# Patient Record
Sex: FEMALE | Race: WHITE | Employment: FULL TIME | URBAN - METROPOLITAN AREA
[De-identification: names, ages, dates, MRNs, and addresses within clinical notes are randomized per-mention and may not be internally consistent; named-entity substitution may affect disease eponyms.]

---

## 2017-05-09 ENCOUNTER — HOSPITAL ENCOUNTER (EMERGENCY)
Facility: CLINIC | Age: 42
Discharge: HOME | End: 2017-05-09
Attending: EMERGENCY MEDICINE | Admitting: EMERGENCY MEDICINE
Payer: OTHER MISCELLANEOUS

## 2017-05-09 VITALS
WEIGHT: 165 LBS | BODY MASS INDEX: 30.36 KG/M2 | HEIGHT: 62 IN | HEART RATE: 95 BPM | TEMPERATURE: 98.6 F | SYSTOLIC BLOOD PRESSURE: 153 MMHG | OXYGEN SATURATION: 100 % | DIASTOLIC BLOOD PRESSURE: 88 MMHG | RESPIRATION RATE: 14 BRPM

## 2017-05-09 DIAGNOSIS — Z57.8 EMPLOYEE EXPOSURE TO BODY FLUIDS: ICD-10-CM

## 2017-05-09 DIAGNOSIS — S61.412A HAND LACERATION, LEFT, INITIAL ENCOUNTER: ICD-10-CM
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
## 2017-05-09 PROCEDURE — 99283 EMERGENCY DEPT VISIT LOW MDM: CPT | Mod: Z6 | Performed by: EMERGENCY MEDICINE

## 2017-05-09 PROCEDURE — 25000125 ZZHC RX 250: Performed by: EMERGENCY MEDICINE

## 2017-05-09 PROCEDURE — 90471 IMMUNIZATION ADMIN: CPT | Performed by: EMERGENCY MEDICINE

## 2017-05-09 PROCEDURE — 90715 TDAP VACCINE 7 YRS/> IM: CPT | Performed by: EMERGENCY MEDICINE

## 2017-05-09 PROCEDURE — 99283 EMERGENCY DEPT VISIT LOW MDM: CPT | Mod: 25 | Performed by: EMERGENCY MEDICINE

## 2017-05-09 RX ADMIN — CLOSTRIDIUM TETANI TOXOID ANTIGEN (FORMALDEHYDE INACTIVATED), CORYNEBACTERIUM DIPHTHERIAE TOXOID ANTIGEN (FORMALDEHYDE INACTIVATED), BORDETELLA PERTUSSIS TOXOID ANTIGEN (GLUTARALDEHYDE INACTIVATED), BORDETELLA PERTUSSIS FILAMENTOUS HEMAGGLUTININ ANTIGEN (FORMALDEHYDE INACTIVATED), BORDETELLA PERTUSSIS PERTACTIN ANTIGEN, AND BORDETELLA PERTUSSIS FIMBRIAE 2/3 ANTIGEN 0.5 ML: 5; 2; 2.5; 5; 3; 5 INJECTION, SUSPENSION INTRAMUSCULAR at 18:18

## 2017-05-09 SDOH — HEALTH STABILITY - PHYSICAL HEALTH: OCCUPATIONAL EXPOSURE TO OTHER RISK FACTORS: Z57.8

## 2017-05-09 ASSESSMENT — ENCOUNTER SYMPTOMS
FEVER: 0
WOUND: 1
CHILLS: 0
HEMATOLOGIC/LYMPHATIC NEGATIVE: 1

## 2017-05-09 NOTE — ED AVS SNAPSHOT
[facility], [place], Emergency Department    [address] [facility] [address]    Phone:  [phone]                                       Dianelys Wells   MRN: [id number]    Department:  [facility], Emergency Department   Date of Visit:  5/9/2017           After Visit Summary Signature Page     I have received my discharge instructions, and my questions have been answered. I have discussed any challenges I see with this plan with the nurse or doctor.    ..........................................................................................................................................  Patient/Patient Representative Signature      ..........................................................................................................................................  Patient Representative Print Name and Relationship to Patient    ..................................................               ................................................  Date                                            Time    ..........................................................................................................................................  Reviewed by Signature/Title    ...................................................              ..............................................  Date                                                            Time

## 2017-05-09 NOTE — ED AVS SNAPSHOT
[facility], Emergency Department    [address] [address]    Phone:  [phone]                                       Dianelys Wells   MRN: [id number]    Department:  [facility], Emergency Department   Date of Visit:  5/9/2017           Patient Information     Date Of Birth          1975        Your diagnoses for this visit were:     Hand laceration, left, initial encounter        You were seen by Fe Orta MD.        Discharge Instructions       Thank you for coming to the [facility] Emergency Department.     Tetanus immunization status updated today with Tdap.     Keep the hand wound clean and dry today, then wash hands as usual starting tomorrow. Monitor the wound for signs of infection and return for redness, swelling, increased pain, pus draining from the wound, or fever >100.4F.     Follow up with the nursing supervisor tonight for source patient blood test results. Return to the ER tonight to start HIV PEP if source pt blood is positive.           *LACERATION (All: sutures, staples, tape, glue)  A laceration is a cut through the skin. This will usually require stitches (sutures) or staples if it is deep. Minor cuts may be treated with a tape closure ( Steri-Strips ) or Dermabond skin glue.    HOME CARE:  1. EXTREMITY, FACE or TRUNK WOUNDS: Keep the wound clean and dry. If a bandage was applied and it becomes wet or dirty, replace it. Otherwise, leave it in place for the first 24 hours.    If stitches or staples were used, clean the wound daily. Protect the wound from sunlight and tanning lamps.    After removing the bandage, wash the area with soap and water. Use a wet cotton swab (Q tip) to loosen and remove any blood or crust that forms.    After cleaning, apply a thin layer of Polysporin or Bacitracin ointment. This will keep the wound clean and make it easier to remove the stitches or staples. Reapply a fresh bandage.    You may  remove the bandage to shower as usual after the first 24 hours, but do not soak the area in water (no swimming) until the stitches or staples are removed.    If Steri-Strips were used, keep the area clean and dry. If it becomes wet, blot it dry with a towel. It is okay to take a brief shower, but avoid scrubbing the area.    If Dermabond skin adhesive was used, do not scratch, rub or pick at the adhesive film. Do not place tape directly over the film. Do not apply liquid, ointment or creams to the wound while the film is in place. Do not clean the wound with peroxide and do not apply ointments. Avoid activities that cause heavy sweating until the film has fallen off. Protect the wound from prolonged exposure to sunlight or tanning lamps. You may shower as usual but do not soak the wound in water (no baths or swimming). The film will fall off by itself in 5-10 days.  2. SCALP WOUNDS: During the first two days, you may carefully rinse your hair in the shower to remove blood, glass or dirt particles. After two days, you may shower and shampoo your hair normally. Do not soak your scalp in the tub or go swimming until the stitches or staples have been removed.  3. MOUTH WOUNDS: Eat soft foods to reduce pain. If the cut is inside of your mouth, clean by rinsing after each meal and at bedtime with a mixture of equal parts water and Hydrogen Peroxide (do not swallow!). Or, you can use a cotton swab to directly apply Hydrogen Peroxide onto the cut.  4. You may use acetaminophen (Tylenol) 650-1000 mg every 6 hours or ibuprofen (Motrin, Advil) 600 mg every 6-8 hours with food to control pain, if you are able to take these medicines. [NOTE: If you have chronic liver or kidney disease or ever had a stomach ulcer or GI bleeding, talk with your doctor before using these medicines.]  Use sunscreen on the area for 6 months after the wound heals to keep the scar from getting darker.   FOLLOW UP: Most skin wounds heal within ten days.  Mouth and facial wounds heal within five days. However, even with proper treatment, a wound infection may sometimes occur. Therefore, you should check the wound daily for signs of infection listed below.  Stitches should be removed from the face within five days; stitches and staples should be removed from other parts of the body within 7-10 days. Unless you are told to come back to the emergency room, you may have your doctor or urgent care remove the stitches. If dissolving stitches were used in the mouth, these will fall out or dissolve without the need for removal. If tape closures ( Steri-Strips ) were used, remove them yourself if they have not fallen off after 7 days. If Dermabond skin glue was used, the film will fall off by itself in 5-10 days.   GET PROMPT MEDICAL ATTENTION if any of the following occur:    Increasing pain in the wound    Redness, swelling or pus coming from the wound    Fever over 101 F (38.3 C) oral    If stitches or staples come apart or fall out or if Steri-Strips fall off before seven days    If the wound edges re-open    Bleeding not controlled by direct pressure    8873-9218 [employer], [address] [place] [address]. All rights reserved. This information is not intended as a substitute for professional medical care. Always follow your healthcare professional's instructions.      24 Hour Appointment Hotline       To make an appointment at any [facility], call [phone] ([phone]). If you don't have a family doctor or clinic, we will help you find one. [facility] [place] clinics are conveniently located to serve the needs of you and your family.             Review of your medicines      Our records show that you are taking the medicines listed below. If these are incorrect, please call your family doctor or clinic.        Dose / Directions Last dose taken    WELLBUTRIN PO   Dose:  300 mg        Take 300 mg by mouth   Refills:  0                Procedures  and tests performed during your visit     Rapid HIV 1 and 2 Antigen Antibody      Orders Needing Specimen Collection     None      Pending Results     No orders found from 5/7/2017 to 5/10/2017.            Pending Culture Results     No orders found from 5/7/2017 to 5/10/2017.            Pending Results Instructions     If you had any lab results that were not finalized at the time of your Discharge, you can call the ED Lab Result RN at [phone]. You will be contacted by this team for any positive Lab results or changes in treatment. The nurses are available 7 days a week from 10A to 6:30P.  You can leave a message 24 hours per day and they will return your call.        Thank you for choosing [facility]       Thank you for choosing [facility] for your care. Our goal is always to provide you with excellent care. Hearing back from our patients is one way we can continue to improve our services. Please take a few minutes to complete the written survey that you may receive in the mail after you visit with us. Thank you!        [employer] Information     [employer] gives you secure access to your electronic health record. If you see a primary care provider, you can also send messages to your care team and make appointments. If you have questions, please call your primary care clinic.  If you do not have a primary care provider, please call [phone] and they will assist you.        Care EveryWhere ID     This is your Care EveryWhere ID. This could be used by other organizations to access your [facility] medical records  [id number]        After Visit Summary       This is your record. Keep this with you and show to your community pharmacist(s) and doctor(s) at your next visit.

## 2017-05-09 NOTE — DISCHARGE INSTRUCTIONS
Thank you for coming to the [facility] Emergency Department.     Tetanus immunization status updated today with Tdap.     Keep the hand wound clean and dry today, then wash hands as usual starting tomorrow. Monitor the wound for signs of infection and return for redness, swelling, increased pain, pus draining from the wound, or fever >100.4F.     Follow up with the nursing supervisor tonight for source patient blood test results. Return to the ER tonight to start HIV PEP if source pt blood is positive.           *LACERATION (All: sutures, staples, tape, glue)  A laceration is a cut through the skin. This will usually require stitches (sutures) or staples if it is deep. Minor cuts may be treated with a tape closure ( Steri-Strips ) or Dermabond skin glue.    HOME CARE:  1. EXTREMITY, FACE or TRUNK WOUNDS: Keep the wound clean and dry. If a bandage was applied and it becomes wet or dirty, replace it. Otherwise, leave it in place for the first 24 hours.    If stitches or staples were used, clean the wound daily. Protect the wound from sunlight and tanning lamps.    After removing the bandage, wash the area with soap and water. Use a wet cotton swab (Q tip) to loosen and remove any blood or crust that forms.    After cleaning, apply a thin layer of Polysporin or Bacitracin ointment. This will keep the wound clean and make it easier to remove the stitches or staples. Reapply a fresh bandage.    You may remove the bandage to shower as usual after the first 24 hours, but do not soak the area in water (no swimming) until the stitches or staples are removed.    If Steri-Strips were used, keep the area clean and dry. If it becomes wet, blot it dry with a towel. It is okay to take a brief shower, but avoid scrubbing the area.    If Dermabond skin adhesive was used, do not scratch, rub or pick at the adhesive film. Do not place tape directly over the film. Do not apply liquid, ointment or creams to  the wound while the film is in place. Do not clean the wound with peroxide and do not apply ointments. Avoid activities that cause heavy sweating until the film has fallen off. Protect the wound from prolonged exposure to sunlight or tanning lamps. You may shower as usual but do not soak the wound in water (no baths or swimming). The film will fall off by itself in 5-10 days.  2. SCALP WOUNDS: During the first two days, you may carefully rinse your hair in the shower to remove blood, glass or dirt particles. After two days, you may shower and shampoo your hair normally. Do not soak your scalp in the tub or go swimming until the stitches or staples have been removed.  3. MOUTH WOUNDS: Eat soft foods to reduce pain. If the cut is inside of your mouth, clean by rinsing after each meal and at bedtime with a mixture of equal parts water and Hydrogen Peroxide (do not swallow!). Or, you can use a cotton swab to directly apply Hydrogen Peroxide onto the cut.  4. You may use acetaminophen (Tylenol) 650-1000 mg every 6 hours or ibuprofen (Motrin, Advil) 600 mg every 6-8 hours with food to control pain, if you are able to take these medicines. [NOTE: If you have chronic liver or kidney disease or ever had a stomach ulcer or GI bleeding, talk with your doctor before using these medicines.]  Use sunscreen on the area for 6 months after the wound heals to keep the scar from getting darker.   FOLLOW UP: Most skin wounds heal within ten days. Mouth and facial wounds heal within five days. However, even with proper treatment, a wound infection may sometimes occur. Therefore, you should check the wound daily for signs of infection listed below.  Stitches should be removed from the face within five days; stitches and staples should be removed from other parts of the body within 7-10 days. Unless you are told to come back to the emergency room, you may have your doctor or urgent care remove the stitches. If dissolving stitches were  used in the mouth, these will fall out or dissolve without the need for removal. If tape closures ( Steri-Strips ) were used, remove them yourself if they have not fallen off after 7 days. If Dermabond skin glue was used, the film will fall off by itself in 5-10 days.   GET PROMPT MEDICAL ATTENTION if any of the following occur:    Increasing pain in the wound    Redness, swelling or pus coming from the wound    Fever over 101 F (38.3 C) oral    If stitches or staples come apart or fall out or if Steri-Strips fall off before seven days    If the wound edges re-open    Bleeding not controlled by direct pressure    2035-5585 [employer], [address] [place] [address], [place] [address]. All rights reserved. This information is not intended as a substitute for professional medical care. Always follow your healthcare professional's instructions.

## 2017-05-09 NOTE — ED PROVIDER NOTES
"  History     Chief Complaint   Patient presents with     Laceration     Body Fluid Exposure     HPI  Dianelys Wells is a 41 year old female who presents with a hand laceration and body fluid exposure.   Cut the hand with a clean scalpel through a dirty glove. Pt works as a pathology resident.   Hep B immunized.   Hx of negative HIV testing.     Source pt risk factors for infection are not currently known. Source pt is available in the hospital for testing.     I have reviewed the Medications, Allergies, Past Medical and Surgical History, and Social History in the Epic system.    PMH: healthy    Review of Systems   Constitutional: Negative for chills and fever.   Skin: Positive for wound.   Neurological:        No sensation loss   Hematological: Negative.           Physical Exam   BP: 153/88  Pulse: 95  Temp: 98.6  F (37  C)  Resp: 18  Height: 157.5 cm (5' 2\")  Weight: 74.8 kg (165 lb)  SpO2: 100 %    Physical Exam  Gen:A&Ox3, no acute distress  HEENT:head atraumatic  UE: left palm with 0.5cm laceration just proximal to the 4th MCP. Minimal bleeding. No deep structures visible in the base of the wound. No foreign bodies noted. Flexor tendon function of the hand intact. Sensation of the hand and fingers intact. 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   Last Tetanus immunization in 2007, updated today with Tdap.   She accidentally cut her hand with a scalpel during a gross pathology evaluation. Patient presented here to the ED and the Employee Health blood exposure packet was started including patient blood draw and the nursing supervisor is working to obtain source patient blood for testing. Patient is immunized against hepatitis B. She has previously " been negative for HIV testing. She is uncertain of any high-risk features of the source patient. Laceration to the left hand is on the palmar surface just below the 4th MCP with minimal bleeding and no signs of neurovascular or tendon injuries. The wound was iririgaated with saline and closed with steri-strips. I reviewed with her wound care instructions including decreasing stress on the wound for the next week, handwashing technique, and return instructions in the sign or event of infection. She will follow-up with the nursing supervisor regarding source blood and her blood testing for today and will return to the ED for post-exposure prophylaxis if source patient is found to be HIV positive. Follow-up with employee health.      This part of the medical record was transcribed by Gino Eastman, Medical Scribe, from a dictation done by Fe Orta MD.    I have reviewed the nursing notes.  I have reviewed the findings, diagnosis, plan and need for follow up with the patient.    Discharge Medication List as of 5/9/2017  6:07 PM          Final diagnoses:   Hand laceration, left, initial encounter   Employee exposure to body fluids       5/9/2017   [facility], [place], EMERGENCY DEPARTMENT    MD DONAVAN Jacob Katrina Anne, MD  05/11/17 0947

## 2017-05-09 NOTE — ED NOTES
"Triage Assessment & Note:    /88  Pulse 95  Temp 98.6  F (37  C) (Oral)  Resp 18  Ht 1.575 m (5' 2\")  Wt 74.8 kg (165 lb)  SpO2 100%  BMI 30.18 kg/m2    Patient presents with: Resident in pathology lab was struck with a clean surgical blade but had a dirty glove on.     Home Treatments/Remedies: soap and water    Febrile / Afebrile? Afebrile    Duration of C/o:  1610    Dell Infante  May 9, 2017      "

## 2017-08-12 ENCOUNTER — HEALTH MAINTENANCE LETTER (OUTPATIENT)
Age: 42
End: 2017-08-12

## 2018-03-05 ENCOUNTER — OFFICE VISIT (OUTPATIENT)
Dept: ORTHOPEDICS | Facility: CLINIC | Age: 43
End: 2018-03-05
Payer: COMMERCIAL

## 2018-03-05 ENCOUNTER — RADIANT APPOINTMENT (OUTPATIENT)
Dept: GENERAL RADIOLOGY | Facility: CLINIC | Age: 43
End: 2018-03-05
Attending: FAMILY MEDICINE
Payer: COMMERCIAL

## 2018-03-05 VITALS
HEART RATE: 80 BPM | SYSTOLIC BLOOD PRESSURE: 129 MMHG | DIASTOLIC BLOOD PRESSURE: 85 MMHG | WEIGHT: 188.5 LBS | BODY MASS INDEX: 35.59 KG/M2 | HEIGHT: 61 IN

## 2018-03-05 DIAGNOSIS — M25.562 ACUTE PAIN OF LEFT KNEE: ICD-10-CM

## 2018-03-05 DIAGNOSIS — M25.562 ACUTE PAIN OF LEFT KNEE: Primary | ICD-10-CM

## 2018-03-05 RX ORDER — MEDROXYPROGESTERONE ACETATE 150 MG/ML
150 INJECTION, SUSPENSION INTRAMUSCULAR
COMMUNITY

## 2018-03-05 NOTE — PROGRESS NOTES
"[facility] Sports and Orthopedic Walk-in Clinic Note      Patient is a 42 year old female who presents to the office today for: left knee pain   six weeks ago she tripped and fell going up the stairs. Had pain in left knee laterally. Pain and swelling have improved but still Pain with exercising and full flexion. Used crutches initially. Now ok with walking. Pain with pivoting and shifting. . Has been icing and taking aleve. No prior knee injury.     Denies weakness, numbness, tingling, clicking, locking, or catching.      Past Medical History, Current Medications, and Allergies are reviewed in the electronic medical record as appropriate.     ROS: Pertinent items are noted in HPI.  Constitutional: negative for fevers, chills and malaise  Cardiovascular: negative for dyspnea, fatigue, lower extremity edema  Integument/breast: negative for rash, skin lesion(s) and skin color change  Neurological: negative for paresthesia and weakness      EXAM:/85  Pulse 80  Ht 5' 1.42\" (1.56 m)  Wt 188 lb 8 oz (85.5 kg)  BMI 35.13 kg/m2    Patient is alert, No acute distress, pleasant and conversational.    Gait: nonantalgic. Normal heel toe gait.    left knee:   Skin intact. No erythema or ecchymosis.  No effusion or soft tissue swelling.    AROM: Zero to approximately 135  with pain at full flexion    Palpation: No medial or lateral facet joint tenderness.  No posterior medial or posterior lateral joint line tenderness     Special Tests:  Positive bounce test, + forced flexion and + Arsalan's.  No ligamentous laxity or pain with valgus or varus stress.  Negative Lachman's, Anterior Drawer and Posterior Drawer     Full Isometric quad strength, extensor mechanism in place     Neurovascularly intact in the lower extremity    Hip and Ankle with full AROM and nontender      Radiographs: xrays of left knee performed and reviewed independently demonstrating essentially normal left knee    Assessment: Patient is a 42 year old " female with persistent left knee pain after fall 6 weeks ago. Concern for meniscus injury.     Recommendations:   Discussed MRI vs trial of PT and bracing and patient opts for the latter  Fitted with procare stabilizing knee brace   Given home exercises and PT referral  Follow up in 6 weeks or sooner if worsening symptoms.     Surjit Carbajal MD

## 2018-03-05 NOTE — PROGRESS NOTES
NEW PATIENT INTAKE QUESTIONNAIRE  SPORTS & ORTHOPEDIC WALK-IN 3/5/2018    Primary Care Physician: Dr. Lazara Starks at [facility]    Where are the majority of your medical records? [facility]    Reason for Visit:    What part of your body is injured / painful?  left knee    What caused the injury /pain? Fall    How long ago did your injury occur or pain begin? several weeks ago    What are your most bothersome symptoms? Pain, Weakness, Inability to compete/participate in sport or activity and Other: Unable to work out    How would you characterize your symptom? sharp    What makes your symptoms better? Rest, Ice and Ibuprofen    What makes your symptoms worse? Bending and Squatting    Have you been previously seen for this problem? No    Medical History:    Medical History: none    Have you had surgery on this body part before? No    Medications: Depo and Wellbutrin    Allergies: Yes: Sulfa     Family History of Medical Problems: no    Previous Surgeries: none    Social History:    Occupation: Medical Resident     Handedness: Right    Exercise: 4-5 days/week    Review of Systems:    Have you recently had a a fever, chills, weight loss? No    Do you have any vision problems? No    Do you have any chest pain or edema? No    Do you have any shortness of breath or wheezing?  No    Do you have stomach problems? No    Do you have any numbness or focal weakness? No    Do you have diabetes? No    Do you have problems with bleeding or clotting? No    Do you have an rashes or other skin lesions? No    Communication:    How did you hear about us? I [place] an advertisement[place] [facility] [place]    Who else should know about this visit? PCP, Dr. Starks

## 2018-03-05 NOTE — Clinical Note
3/5/2018       RE: Dianelys Wells  [address] [facility] [address]     Dear Colleague,    Thank you for referring your patient, Dianelys Wells, to the [facility] SPORTS AND ORTHOPAEDIC WALK IN CLINIC at [facility]. Please see a copy of my visit note below.    No notes on file    Again, thank you for allowing me to participate in the care of your patient.      Sincerely,    Surjit Carbajal MD

## 2018-03-05 NOTE — MR AVS SNAPSHOT
"              After Visit Summary   3/5/2018    Dianelys Wells    MRN: [id number]           Patient Information     Date Of Birth          1975        Visit Information        Provider Department      3/5/2018 3:20 PM Surjit Carbajal MD [facility]        Today's Diagnoses     Acute pain of left knee    -  1       Follow-ups after your visit        Additional Services     [facility] PT, HAND, AND CHIROPRACTIC REFERRAL       Physical Therapy Referral                  Who to contact     Please call your clinic at [phone] to:    Ask questions about your health    Make or cancel appointments    Discuss your medicines    Learn about your test results    Speak to your doctor            Additional Information About Your Visit        Cubiehart Information     [employer] gives you secure access to your electronic health record. If you see a primary care provider, you can also send messages to your care team and make appointments. If you have questions, please call your primary care clinic.  If you do not have a primary care provider, please call [phone] and they will assist you.      [employer] is an electronic gateway that provides easy, online access to your medical records. With [employer], you can request a clinic appointment, read your test results, renew a prescription or communicate with your care team.     To access your existing account, please contact your [facility] or call [phone] for assistance.        Care EveryWhere ID     This is your Care EveryWhere ID. This could be used by other organizations to access your [facility] medical records  [id number]        Your Vitals Were     Pulse Height BMI (Body Mass Index)             80 5' 1.42\" (1.56 m) 35.13 kg/m2          Blood Pressure from Last 3 Encounters:   03/05/18 129/85   05/09/17 153/88   02/28/15 139/61    Weight from Last 3 Encounters:   03/05/18 188 lb 8 oz (85.5 kg)   05/09/17 " 165 lb (74.8 kg)              We Performed the Following     [facility] PT, HAND, AND CHIROPRACTIC REFERRAL        Primary Care Provider Office Phone # Fax #    Lazara Starks [phone]       [facility] [address]  [facility] [address]        Equal Access to Services     BAO NOE : Hadii aad ku hadasho Soomaali, waaxda luqadaha, qaybta kaalmada adeegyada, waxay idiin hayaan adeeg amberfernando laloreta . So [facility] [phone].    ATENCIÓN: Si habla español, tiene a granger disposición servicios gratuitos de asistencia lingüística. [facility] [phone].    We comply with applicable federal civil rights laws and [place] laws. We do not discriminate on the basis of race, color, national origin, age, disability, sex, sexual orientation, or gender identity.            Thank you!     Thank you for choosing [facility] SPORTS AND ORTHOPAEDIC WALK IN CLINIC  for your care. Our goal is always to provide you with excellent care. Hearing back from our patients is one way we can continue to improve our services. Please take a few minutes to complete the written survey that you may receive in the mail after your visit with us. Thank you!             Your Updated Medication List - Protect others around you: Learn how to safely use, store and throw away your medicines at www.disposemymeds.org.          This list is accurate as of 3/5/18  6:20 PM.  Always use your most recent med list.                   Brand Name Dispense Instructions for use Diagnosis    DEPO-PROVERA 150 MG/ML injection   Generic drug:  medroxyPROGESTERone      Inject 150 mg into the muscle every 3 months        WELLBUTRIN PO      Take 300 mg by mouth

## 2018-03-15 DIAGNOSIS — M25.562 ACUTE PAIN OF LEFT KNEE: Primary | ICD-10-CM

## 2018-03-20 ENCOUNTER — THERAPY VISIT (OUTPATIENT)
Dept: PHYSICAL THERAPY | Facility: CLINIC | Age: 43
End: 2018-03-20
Payer: COMMERCIAL

## 2018-03-20 DIAGNOSIS — M25.562 ACUTE PAIN OF LEFT KNEE: Primary | ICD-10-CM

## 2018-03-20 PROCEDURE — 97110 THERAPEUTIC EXERCISES: CPT | Mod: GP

## 2018-03-20 PROCEDURE — 97530 THERAPEUTIC ACTIVITIES: CPT | Mod: GP

## 2018-03-20 PROCEDURE — 97161 PT EVAL LOW COMPLEX 20 MIN: CPT | Mod: GP

## 2018-03-20 ASSESSMENT — ACTIVITIES OF DAILY LIVING (ADL)
KNEEL ON THE FRONT OF YOUR KNEE: ACTIVITY IS FAIRLY DIFFICULT
SIT WITH YOUR KNEE BENT: ACTIVITY IS FAIRLY DIFFICULT
WALK: ACTIVITY IS NOT DIFFICULT
KNEE_ACTIVITY_OF_DAILY_LIVING_SUM: 56
STAND: ACTIVITY IS NOT DIFFICULT
RISE FROM A CHAIR: ACTIVITY IS MINIMALLY DIFFICULT
STIFFNESS: THE SYMPTOM AFFECTS MY ACTIVITY SLIGHTLY
GIVING WAY, BUCKLING OR SHIFTING OF KNEE: I DO NOT HAVE THE SYMPTOM
LIMPING: I DO NOT HAVE THE SYMPTOM
RAW_SCORE: 56
WEAKNESS: I DO NOT HAVE THE SYMPTOM
SQUAT: ACTIVITY IS FAIRLY DIFFICULT
PAIN: THE SYMPTOM AFFECTS MY ACTIVITY SLIGHTLY
AS_A_RESULT_OF_YOUR_KNEE_INJURY,_HOW_WOULD_YOU_RATE_YOUR_CURRENT_LEVEL_OF_DAILY_ACTIVITY?: NEARLY NORMAL
HOW_WOULD_YOU_RATE_THE_CURRENT_FUNCTION_OF_YOUR_KNEE_DURING_YOUR_USUAL_DAILY_ACTIVITIES_ON_A_SCALE_FROM_0_TO_100_WITH_100_BEING_YOUR_LEVEL_OF_KNEE_FUNCTION_PRIOR_TO_YOUR_INJURY_AND_0_BEING_THE_INABILITY_TO_PERFORM_ANY_OF_YOUR_USUAL_DAILY_ACTIVITIES?: 80
HOW_WOULD_YOU_RATE_THE_OVERALL_FUNCTION_OF_YOUR_KNEE_DURING_YOUR_USUAL_DAILY_ACTIVITIES?: NEARLY NORMAL
GO UP STAIRS: ACTIVITY IS NOT DIFFICULT
GO DOWN STAIRS: ACTIVITY IS NOT DIFFICULT
KNEE_ACTIVITY_OF_DAILY_LIVING_SCORE: 80
SWELLING: I DO NOT HAVE THE SYMPTOM

## 2018-03-20 NOTE — MR AVS SNAPSHOT
After Visit Summary   3/20/2018    Dianelys Wells    MRN: [id number]           Patient Information     Date Of Birth          1975        Visit Information        Provider Department      3/20/2018 4:40 PM Donald Mejia PT [facility]        Today's Diagnoses     Acute pain of left knee    -  1       Follow-ups after your visit        Your next 10 appointments already scheduled     Mar 23, 2018  2:00 PM CDT   (Arrive by 1:45 PM)   MR KNEE LEFT W/O CONTRAST with UUMR2   [facility] [place], MRI ([facility])    [address] [facility] [address] [phone]           Take your medicines as usual, unless your doctor tells you not to. Bring a list of your current medicines to your exam (including vitamins, minerals and over-the-counter drugs). Also bring the results of similar scans you may have had.  Please remove any body piercings and hair extensions before you arrive.  Follow your doctor s orders. If you do not, we may have to postpone your exam.  You may or may not receive IV contrast for this exam pending the discretion of the Radiologist.  You do not need to do anything special to prepare.  The MRI machine uses a strong magnet. Please wear clothes without metal (snaps, zippers). A sweatsuit works well, or we may give you a hospital gown.   **IMPORTANT** THE INSTRUCTIONS BELOW ARE ONLY FOR THOSE PATIENTS WHO HAVE BEEN PRESCRIBED SEDATION OR GENERAL ANESTHESIA DURING THEIR MRI PROCEDURE:  IF YOUR DOCTOR PRESCRIBED ORAL SEDATION (take medicine to help you relax during your exam):   You must get the medicine from your doctor (oral medication) before you arrive. Bring the medicine to the exam. Do not take it at home. You ll be told when to take it upon arriving for your exam.   Arrive one hour early. Bring someone who can take you home after the test. Your medicine will make you sleepy. After the exam, you may not  drive, take a bus or take a taxi by yourself.  IF YOUR DOCTOR PRESCRIBED IV SEDATION:   Arrive one hour early. Bring someone who can take you home after the test. Your medicine will make you sleepy. After the exam, you may not drive, take a bus or take a taxi by yourself.   No eating 6 hours before your exam. You may have clear liquids up until 4 hours before your exam. (Clear liquids include water, clear tea, black coffee and fruit juice without pulp.)  IF YOUR DOCTOR PRESCRIBED ANESTHESIA (be asleep for your exam):   Arrive 1 1/2 hours early. Bring someone who can take you home after the test. You may not drive, take a bus or take a taxi by yourself.   No eating 8 hours before your exam. You may have clear liquids up until 4 hours before your exam. (Clear liquids include water, clear tea, black coffee and fruit juice without pulp.)   You will spend four to five hours in the recovery room.  Please call the Imaging Department at your exam site with any questions.              Who to contact     If you have questions or need follow up information about today's clinic visit or your schedule please contact [facility] PHYSICAL THERAPY [facility] directly at [phone].  Normal or non-critical lab and imaging results will be communicated to you by [employer], letter or phone within 4 business days after the clinic has received the results. If you do not hear from us within 7 days, please contact the clinic through [facility] or phone. If you have a critical or abnormal lab result, we will notify you by phone as soon as possible.  Submit refill requests through [employer] or call your pharmacy and they will forward the refill request to us. Please allow 3 business days for your refill to be completed.          Additional Information About Your Visit        Syntaxin Information     [employer] gives you secure access to your electronic health record. If you see a primary care provider, you can also send messages to your care team and make  appointments. If you have questions, please call your primary care clinic.  If you do not have a primary care provider, please call [phone] and they will assist you.        Care EveryWhere ID     This is your Care EveryWhere ID. This could be used by other organizations to access your [facility] medical records  [id number]         Blood Pressure from Last 3 Encounters:   03/05/18 129/85   05/09/17 153/88   02/28/15 139/61    Weight from Last 3 Encounters:   03/05/18 85.5 kg (188 lb 8 oz)   05/09/17 74.8 kg (165 lb)              We Performed the Following     HC PT EVAL, LOW COMPLEXITY     [facility] INITIAL EVAL REPORT     THERAPEUTIC ACTIVITIES     THERAPEUTIC EXERCISES        Primary Care Provider Office Phone # Fax #    Lazara Starks [phone]       [facility] [address]  [facility] [address]        Equal Access to Services     [facility] CenterSIGIFREDO : Hadii aad ku hadasho Soomaali, waaxda luqadaha, qaybta kaalmada adeegyada, waxay idiin hayaan arben cruz . So [facility] [phone].    ATENCIÓN: Si habla español, tiene a granger disposición servicios gratuitos de asistencia lingüística. [facility] al [phone].    We comply with applicable federal civil rights laws and [place] laws. We do not discriminate on the basis of race, color, national origin, age, disability, sex, sexual orientation, or gender identity.            Thank you!     Thank you for choosing [facility] PHYSICAL THERAPY [facility]  for your care. Our goal is always to provide you with excellent care. Hearing back from our patients is one way we can continue to improve our services. Please take a few minutes to complete the written survey that you may receive in the mail after your visit with us. Thank you!             Your Updated Medication List - Protect others around you: Learn how to safely use, store and throw away your medicines at www.disposemymeds.org.          This list is accurate as of 3/20/18  5:21 PM.  Always use your  most recent med list.                   Brand Name Dispense Instructions for use Diagnosis    DEPO-PROVERA 150 MG/ML injection   Generic drug:  medroxyPROGESTERone      Inject 150 mg into the muscle every 3 months        WELLBUTRIN PO      Take 300 mg by mouth

## 2018-03-20 NOTE — PROGRESS NOTES
[facility] Initial Evaluation  Subjective:  Patient is a 42 year old female presenting with rehab left knee hpi.   Dianelys Wells is a 42 year old female with a left knee condition.  Condition occurred with:  A fall/slip.  Condition occurred: at home.  This is a new condition  Onset: 1/12/18 fell down stairs.  Was improving but has stopped and reached a plateau. MRI schedule for 3/23/18. Enjoys cross-fit and feels pain post-lateral .    Patient reports pain:  Posterior and lateral.    Pain is described as sharp and is intermittent    Pain is the same all the time.  Exacerbated by: deep squat, or end range knee bend, or ankle plantar flexed. Relieved by: avoidance   Pain course: improved but has stopped                                                       Objective:  System                                                Knee Evaluation:  ROM:  Strength wnl knee: Proximal hip strength: L=4+/5 abd, ext.  AROM    Hyperextension:  Left:  0    Right: 0  Extension:  Left: 0    Right:  0  Flexion: Left: 120    Right: 135            Special Tests: Special test for knee: Inconclusive meniscal tests.      Palpation:  Palpation of knee: Mild TTP.  Left knee tenderness present at:  Lateral Joint Line    Edema:  Edema of the knee: negatove swipe test but pinchy at end range flexion.      Functional Testing:  : step down test: pain and loss of control                   General     ROS    Assessment/Plan:    Patient is a 42 year old female with left side knee complaints.    Patient has the following significant findings with corresponding treatment plan.                Diagnosis 1:  L knee pain- s&sx suggest possible meniscal injury    Decreased ROM/flexibility - manual therapy and therapeutic exercise  Decreased strength - therapeutic exercise and therapeutic activities  Impaired muscle performance - neuro re-education  Decreased function - therapeutic activities    Therapy Evaluation Codes:   1) History  comprised of:   Personal factors that impact the plan of care:      None.    Comorbidity factors that impact the plan of care are:      None.     Medications impacting care: None.  2) Examination of Body Systems comprised of:   Body structures and functions that impact the plan of care:      Knee.   Activity limitations that impact the plan of care are:      Squatting/kneeling.  3) Clinical presentation characteristics are:   Stable/Uncomplicated.  4) Decision-Making    Low complexity using standardized patient assessment instrument and/or measureable assessment of functional outcome.  Cumulative Therapy Evaluation is: Low complexity.    Previous and current functional limitations:  (See Goal Flow Sheet for this information)    Short term and Long term goals: (See Goal Flow Sheet for this information)     Communication ability:  Patient appears to be able to clearly communicate and understand verbal and written communication and follow directions correctly.  Treatment Explanation - The following has been discussed with the patient:   RX ordered/plan of care  Anticipated outcomes  Possible risks and side effects  This patient would benefit from PT intervention to resume normal activities.   Rehab potential is good.    Frequency:  1 X week, once daily  Duration:  for 4-6 weeks  Discharge Plan:  Achieve all LTG.  Independent in home treatment program.  Reach maximal therapeutic benefit.    Please refer to the daily flowsheet for treatment today, total treatment time and time spent performing 1:1 timed codes.

## 2018-03-23 ENCOUNTER — HOSPITAL ENCOUNTER (OUTPATIENT)
Dept: MRI IMAGING | Facility: CLINIC | Age: 43
Discharge: HOME | End: 2018-03-23
Attending: FAMILY MEDICINE | Admitting: FAMILY MEDICINE
Payer: COMMERCIAL

## 2018-03-23 DIAGNOSIS — M25.562 ACUTE PAIN OF LEFT KNEE: ICD-10-CM

## 2018-03-23 PROCEDURE — 73721 MRI JNT OF LWR EXTRE W/O DYE: CPT | Mod: LT

## 2018-04-12 NOTE — TELEPHONE ENCOUNTER
FUTURE VISIT INFORMATION      FUTURE VISIT INFORMATION:    Date: 4/16    Time: 10:40    Location: [facility]  REFERRAL INFORMATION:    Referring provider:  Surjit Carbajal    Referring providers clinic:   [facility] walk [facility]    Reason for visit/diagnosis  L knee ACL/meniscus tear    RECORDS REQUESTED FROM:       Clinic name Comments Records Status Imaging Status   [facility] walk in [facility] Saw Dr. Carbajal internal internal   [facility]  Saw Donald Mejia internal                              RECORDS STATUS

## 2018-04-16 ENCOUNTER — DOCUMENTATION ONLY (OUTPATIENT)
Dept: ORTHOPEDICS | Facility: CLINIC | Age: 43
End: 2018-04-16

## 2018-04-16 ENCOUNTER — OFFICE VISIT (OUTPATIENT)
Dept: ORTHOPEDICS | Facility: CLINIC | Age: 43
End: 2018-04-16
Payer: COMMERCIAL

## 2018-04-16 ENCOUNTER — PRE VISIT (OUTPATIENT)
Dept: ORTHOPEDICS | Facility: CLINIC | Age: 43
End: 2018-04-16

## 2018-04-16 VITALS — BODY MASS INDEX: 35.5 KG/M2 | WEIGHT: 188 LBS | HEIGHT: 61 IN

## 2018-04-16 DIAGNOSIS — S83.512A RUPTURE OF ANTERIOR CRUCIATE LIGAMENT OF LEFT KNEE, INITIAL ENCOUNTER: Primary | ICD-10-CM

## 2018-04-16 ASSESSMENT — ENCOUNTER SYMPTOMS
SINUS PAIN: 1
COUGH: 1
SINUS CONGESTION: 1
HEMOPTYSIS: 0
WHEEZING: 0
HOARSE VOICE: 0
NECK MASS: 0
TASTE DISTURBANCE: 0
POSTURAL DYSPNEA: 0
TROUBLE SWALLOWING: 0
SHORTNESS OF BREATH: 0
DYSPNEA ON EXERTION: 0
SMELL DISTURBANCE: 0
SPUTUM PRODUCTION: 1
COUGH DISTURBING SLEEP: 0
SORE THROAT: 1
SNORES LOUDLY: 0

## 2018-04-16 NOTE — LETTER
4/16/2018     RE: Dianelys Wells  [address] [facility] [address]     Dear Colleague,    Thank you for referring your patient, Dinaelys Wells, to the [facility] ORTHOPAEDIC [facility]INIC at [facility]. Please see a copy of my visit note below.    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.  In January of this year she fell down the stairs while doing laundry.  Immediate onset of knee pain.  Difficult to walk.  Ultimately got a workup to the walk-in clinic demonstrating a complete rupture of her ACL and medial meniscus tear.  Referred to my clinic for definitive management.    PAST MEDICAL HISTORY: (Reviewed with the patient and in the [facility] medical record)  1. None    PAST SURGICAL HISTORY: (Reviewed with the patient and in the [facility] medical record)  1. None    MEDICATIONS: (Reviewed with the patient and in the [facility] medical record)    Notable medications include: Wellbutrin    ALLERGIES: (Reviewed with the patient and in the [facility] medical record)  1. Sulfa      SOCIAL HISTORY: (Reviewed with the patient and in the medical record)  --Tobacco: None  --Occupation: Pathologist  --Avocation/Sport: CrossFit and playing with dogs    FAMILY HISTORY: (Reviewed with the patient and in the medical record)  -- No family history of bleeding, clotting, or difficulty with anesthesia        REVIEW OF SYSTEMS: (Reviewed with the patient and on the health intake form)  -- A comprehensive 10 point review of systems was conducted and is negative except as noted in the HPI    EXAM:     General: Awake alert and oriented no acute distress.  Articulate and interactive.    Body mass index is 35.04 kg/(m^2).    Left lower extremity :    Skin is intact     Trace    Positive medial joint line tenderness less so laterally    0-135    Stable to varus valgus stress testing stable posterior  drawer.  2+ anterior drawer.  2B Lachman, 1+ pivot shift    EHL/FHL/TA/GS 5/5    Sensation intact L3-S1    2+ Dorsalis Pedis Pulse         IMAGING:    Plain Radiographs: AP lateral radiographs show no fractures dislocations    MRI: Grade 3 rupture of the left ACL medial meniscus tear    ASSESSMENT:  1. ACL tear  2. Medial meniscus tear    PLAN:  1. Offered ACL reconstruction hamstring autograft, repair of medial meniscus    I discussed with the patient the risks, benefits, complications and techniques of surgery as well as the natural history of ACL tears, meniscus tears and the alternative treatment options.    The risks include, but are not limited to the risk of death and risk of a myocardial infarction, risk of bleeding and a risk of infection, risk of nerve damage and a risk of muscle damage, stiffness, instability, continued pain or worsening pain and re-tear of her ACL, re-tear of her meniscus, DVT, stiffness, need for future surgery.    The patient was provided an opportunity to ask questions and these were answered.     Again, thank you for allowing me to participate in the care of your patient.      Sincerely,    Jaun Biggs MD

## 2018-04-16 NOTE — MR AVS SNAPSHOT
After Visit Summary   4/16/2018    Dianelys Wells    MRN: [id number]           Patient Information     Date Of Birth          1975        Visit Information        Provider Department      4/16/2018 10:40 AM Jaun Biggs MD [facility]opa[facility]        Today's Diagnoses     Rupture of anterior cruciate ligament of left knee, initial encounter    -  1       Follow-ups after your visit        Your next 10 appointments already scheduled     Apr 20, 2018  8:30 AM CDT   (Arrive by 8:15 AM)   PAC EVALUATION with  Pac Virgen 5   [facility] ([facility])    [address] [place] [address]  [address] [facility] [address]   [phone]            Apr 20, 2018  9:30 AM CDT   (Arrive by 9:15 AM)   PAC RN ASSESSMENT with  Pac Rn   [facility] [place] ([facility] [place])    [address] [place] [address]  [address] [facility] [address]   [phone]            Apr 20, 2018 10:10 AM CDT   (Arrive by 9:55 AM)   PAC Anesthesia Consult with  Pac Anesthesiologist   [facility] [place] ([facility])    [address] [place] [address]  [address] [facility] [address]   [phone]            Apr 27, 2018   Procedure with Jaun Biggs MD   [facility] ([facility])    [address] [place] [address]  [address] [facility] [address]   [phone]           Located in the Clinics and Surgery Center at [address] [place] [address].   parking is very convenient and highly recommended.  is a $6 flat rate fee.  Both  and self parkers should enter the main arrival plaza from [facility]; parking attendants will direct you based on your parking preference.            May 07, 2018  9:10 AM CDT   (Arrive by 8:55 AM)   RETURN KNEE with Jaun Biggs MD   [facility]  "([facility])    [address] [facility]  4th Floor  [facility] [address] [phone]              Who to contact     Please call your clinic at [phone] to:    Ask questions about your health    Make or cancel appointments    Discuss your medicines    Learn about your test results    Speak to your doctor            Additional Information About Your Visit        M[employer] Information     [employer] gives you secure access to your electronic health record. If you see a primary care provider, you can also send messages to your care team and make appointments. If you have questions, please call your primary care clinic.  If you do not have a primary care provider, please call [phone] and they will assist you.      [employer] is an electronic gateway that provides easy, online access to your medical records. With [employer], you can request a clinic appointment, read your test results, renew a prescription or communicate with your care team.     To access your existing account, please contact your [facility] or call [phone] for assistance.        Care EveryWhere ID     This is your Care EveryWhere ID. This could be used by other organizations to access your [facility] medical records  [id number]        Your Vitals Were     Height BMI (Body Mass Index)                5' 1.42\" (1.56 m) 35.04 kg/m2           Blood Pressure from Last 3 Encounters:   03/05/18 129/85   05/09/17 153/88   02/28/15 139/61    Weight from Last 3 Encounters:   04/16/18 188 lb (85.3 kg)   03/05/18 188 lb 8 oz (85.5 kg)   05/09/17 165 lb (74.8 kg)              We Performed the Following     [place]-Operative Worksheet        Primary Care Provider Office Phone # Fax #    Lazara Starks [phone]       [facility] [address]  [facility] [address]        Equal Access to Services     BAO NOE : Stormy Jansen, nitesh zhao, lizandro phelps " nikhil lariosnemesiofernando la'aabonnie ah. [facility] [phone].    ATENCIÓN: Si habla abigail, tiene a granger disposición servicios gratuitos de asistencia lingüística. [facility] al [phone].    We comply with applicable federal civil rights laws and [place] laws. We do not discriminate on the basis of race, color, national origin, age, disability, sex, sexual orientation, or gender identity.            Thank you!     Thank you for choosing [facility] ORTHOPAEDIC CLINIC  for your care. Our goal is always to provide you with excellent care. Hearing back from our patients is one way we can continue to improve our services. Please take a few minutes to complete the written survey that you may receive in the mail after your visit with us. Thank you!             Your Updated Medication List - Protect others around you: Learn how to safely use, store and throw away your medicines at www.disposemymeds.org.          This list is accurate as of 4/16/18 12:15 PM.  Always use your most recent med list.                   Brand Name Dispense Instructions for use Diagnosis    DEPO-PROVERA 150 MG/ML injection   Generic drug:  medroxyPROGESTERone      Inject 150 mg into the muscle every 3 months        WELLBUTRIN PO      Take 300 mg by mouth

## 2018-04-16 NOTE — PROGRESS NOTES
Met with patient in exam room to schedule surgery with Dr. Biggs. Patient has been scheduled for surgery for 4/27/18 at the [facility]. Patient will have her H&P done by PAC on 4/20/18. Patient was told that she will receive a call 1-2 days prior to surgery to confirm her arrival time. Patient was agreeable to the plan.

## 2018-04-16 NOTE — PROGRESS NOTES
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  In January of this year she fell down the stairs while doing laundry.  Immediate onset of knee pain.  Difficult to walk.  Ultimately got a workup to the walk-in clinic demonstrating a complete rupture of her ACL and medial meniscus tear.  Referred to my clinic for definitive management.    PAST MEDICAL HISTORY: (Reviewed with the patient and in the [facility] medical record)  1. None    PAST SURGICAL HISTORY: (Reviewed with the patient and in the EPIC medical record)  1. None    MEDICATIONS: (Reviewed with the patient and in the EPIC medical record)    Notable medications include: Wellbutrin    ALLERGIES: (Reviewed with the patient and in the EPIC medical record)  1. Sulfa      SOCIAL HISTORY: (Reviewed with the patient and in the medical record)  --Tobacco: None  --Occupation: Pathologist  --Avocation/Sport: CrossFit and playing with dogs    FAMILY HISTORY: (Reviewed with the patient and in the medical record)  -- No family history of bleeding, clotting, or difficulty with anesthesia        REVIEW OF SYSTEMS: (Reviewed with the patient and on the health intake form)  -- A comprehensive 10 point review of systems was conducted and is negative except as noted in the HPI    EXAM:     General: Awake alert and oriented no acute distress.  Articulate and interactive.    Body mass index is 35.04 kg/(m^2).    Left lower extremity :    Skin is intact     Trace    Positive medial joint line tenderness less so laterally    0-135    Stable to varus valgus stress testing stable posterior drawer.  2+ anterior drawer.  2B Lachman, 1+ pivot shift    EHL/FHL/TA/GS 5/5    Sensation intact L3-S1    2+ Dorsalis Pedis Pulse         IMAGING:    Plain Radiographs: AP lateral radiographs show no fractures dislocations    MRI: Grade 3 rupture of the left ACL medial meniscus  tear    ASSESSMENT:  1. ACL tear  2. Medial meniscus tear    PLAN:  1. Offered ACL reconstruction hamstring autograft, repair of medial meniscus    I discussed with the patient the risks, benefits, complications and techniques of surgery as well as the natural history of ACL tears, meniscus tears and the alternative treatment options.    The risks include, but are not limited to the risk of death and risk of a myocardial infarction, risk of bleeding and a risk of infection, risk of nerve damage and a risk of muscle damage, stiffness, instability, continued pain or worsening pain and re-tear of her ACL, re-tear of her meniscus, DVT, stiffness, need for future surgery.    The patient was provided an opportunity to ask questions and these were answered.

## 2018-04-16 NOTE — NURSING NOTE
Reason For Visit:   Chief Complaint   Patient presents with     Consult     Left knee       ?  No  Occupation: Pathology Resident   Currently working? Yes.  Work status?  Full time.  Date of injury: 01/2018  Type of injury: Tripped and fell going up the stairs. Had pain in left knee laterally..  Date of surgery: NA  Type of surgery: NA.  Smoker: No  Request smoking cessation information: No    Pain Assessment  Patient Currently in Pain: No

## 2018-04-16 NOTE — NURSING NOTE
Teaching Flowsheet   Relevant Diagnosis: Left knee ACL tear, meniscus tear.  Teaching Topic: Left knee EUA, ACL reconstruction hamstring autograft, meniscus repair.     Person(s) involved in teaching:   Patient     Motivation Level:  Asks Questions: Yes  Eager to Learn: Yes  Cooperative: Yes  Receptive (willing/able to accept information): Yes  Any cultural factors/Episcopalian beliefs that may influence understanding or compliance? No     Patient demonstrates understanding of the following:  Reason for the appointment, diagnosis and treatment plan: Yes  Knowledge of proper use of medications and conditions for which they are ordered (with special attention to potential side effects or drug interactions): Yes  Which situations necessitate calling provider and whom to contact: Yes     Teaching Concerns Addressed: Patient will be seen in the PAC clinic for her preoperative exam. Informed consent signed and scanned. Patient understands she should begin physical therapy 3-5 days following surgery; PT order placed.     Proper use and care of brace and crutches. (medical equip, care aids, etc.): Yes  Nutritional needs and diet plan: NA  Pain management techniques: Yes  Wound Care: Yes  How and/when to access community resources: Yes     Instructional Materials Used/Given: Preoperative teaching packet, surgical soap.     Health history negative per patient.    a. Does the patient take five or more prescription medications? No  b. Does patient have difficulty walking up two flights of stairs? No  c. Is patient s BMI 35 or greater? Yes  d. Is patient an insulin dependent diabetic? No  e. Does patient have a history of having a heart attack or a heart surgery of any kind? No  f. Does patient have a history of heart failure? No  g. Does the patient have a history of any type of transplant? No  h. Does the patient use inhalers on a daily basis? No  i. Does the patient have a history of pulmonary hypertension? No  Once the patient  is evaluated by PAC contact (ex: Surgery schedulers name and number, RN's name and number, etc..);  [phone]

## 2018-04-19 ENCOUNTER — ANESTHESIA EVENT (OUTPATIENT)
Dept: SURGERY | Facility: AMBULATORY SURGERY CENTER | Age: 43
End: 2018-04-19

## 2018-04-20 ENCOUNTER — ALLIED HEALTH/NURSE VISIT (OUTPATIENT)
Dept: SURGERY | Facility: CLINIC | Age: 43
End: 2018-04-20
Payer: COMMERCIAL

## 2018-04-20 ENCOUNTER — OFFICE VISIT (OUTPATIENT)
Dept: SURGERY | Facility: CLINIC | Age: 43
End: 2018-04-20
Payer: COMMERCIAL

## 2018-04-20 ENCOUNTER — APPOINTMENT (OUTPATIENT)
Dept: SURGERY | Facility: CLINIC | Age: 43
End: 2018-04-20
Payer: COMMERCIAL

## 2018-04-20 VITALS
SYSTOLIC BLOOD PRESSURE: 121 MMHG | HEIGHT: 61 IN | DIASTOLIC BLOOD PRESSURE: 81 MMHG | WEIGHT: 187.4 LBS | OXYGEN SATURATION: 96 % | BODY MASS INDEX: 35.38 KG/M2 | RESPIRATION RATE: 16 BRPM | TEMPERATURE: 98.3 F | HEART RATE: 75 BPM

## 2018-04-20 DIAGNOSIS — Z01.818 PRE-OP EXAMINATION: Primary | ICD-10-CM

## 2018-04-20 NOTE — MR AVS SNAPSHOT
After Visit Summary   2018    Dianelys Wells    MRN: [id number]           Patient Information     Date Of Birth          1975        Visit Information        Provider Department      2018 9:30 AM Rn, [facility]        Care Instructions    Preparing for Your Surgery      Name:  Dianelys Wells   MRN:  [id number]   :  1975   Today's Date:  2018     Arriving for surgery:  Surgery date:  18  Arrival time:  10:00 am    Please come to:     [facility], 5th floor    [address] [place] [id number]     Parking is available in front of the [facility] and Surgery Center building from 5:30AM to 8:00PM.  -  Proceed to the 5th floor to check into the Ambulatory Surgery Center.              >> There will be patient concierges on the 1st and 5th floor, for assistance or an escort, if you would like.              >> Please call [phone] with any questions.    What can I eat or drink?  -  You may have solid food or milk products until midnight.  -  You may have water, apple juice or 7up/Sprite until 9:00 am.    Which medicines can I take?    -  Please take these medications the day of surgery: regular morning medications.    How do I prepare myself?  -  Take two showers: one the night before surgery; and one the morning of surgery.         Use Scrubcare or Hibiclens to wash from neck down.  You may use your own shampoo and conditioner. No other hair products.   -  Do NOT use lotion, powder, deodorant, or antiperspirant the day of your surgery.  -  Do NOT wear any makeup, fingernail polish or jewelry.  -  Bring your ID and insurance card.    AFTER YOUR SURGERY  Breathing exercises   Breathing exercises help you recover faster. Take deep breaths and let the air out slowly. This will:     Help you wake up after surgery.    Help prevent complications like pneumonia.  Preventing complications will help you go  home sooner.   Nausea and vomiting   You may feel sick to your stomach after surgery; if so, let your nurse know.    Pain control:  After surgery, you may have pain. Our goal is to help you manage your pain. Pain medicine will help you feel comfortable enough to do activities that will help you heal.  These activities may include breathing exercises, walking and physical therapy.   To help your health care team treat your pain we will ask: 1) If you have pain  2) where it is located 3) describe your pain in your words  Methods of pain control include medications given by mouth, vein or by nerve block for some surgeries.  Sequential Compression Device (SCD) or Pneumo Boots:  You may need to wear SCD S on your legs or feet. These are wraps connected to a machine that pumps in air and releases it. The repeated pumping helps prevent blood clots from forming.     Questions or Concerns:    If you have questions or concerns regarding the day of surgery, please call the Preoperative Assessment Center (PAC), Monday-Friday 7AM-7PM:  [phone].    After surgery please call your surgeons office.                     Follow-ups after your visit        Your next 10 appointments already scheduled     Apr 20, 2018  9:30 AM CDT   (Arrive by 9:15 AM)   PAC RN ASSESSMENT with  Pac Rn   [facility] Pre[facility] ([facility])    [address] [place] [address]  4th [facility] [id number] [phone]            Apr 20, 2018 10:10 AM CDT   (Arrive by 9:55 AM)   PAC Anesthesia Consult with  Pac Anesthesiologist   [facility] Preoperative [facility] ([facility])    [address] [place] [address]  4th [facility] [address]   [phone]            Apr 27, 2018   Procedure with Jaun Biggs MD   [facility] ([facility])    [address] [place] [address]  5th [facility] [address]   [phone]            Located in the Clinics and Surgery Center at [address] [place] [address].   parking is very convenient and highly recommended.  is a $6 flat rate fee.  Both  and self parkers should enter the main arrival plaza from [facility]; parking attendants will direct you based on your parking preference.            Apr 30, 2018  3:10 PM CDT   JENN Extremity with Gigi See PT   [facility] Physical Therapy JENN ([facility])    [address] [place] [address] 5th [facility] [address] [phone]            May 07, 2018  9:10 AM CDT   (Arrive by 8:55 AM)   RETURN KNEE with Jaun Biggs MD   [facility]opa[facility] ([facility])    [address] [place] [address]  4th [facility] [address] [phone]              Who to contact     Please call your clinic at [phone] to:    Ask questions about your health    Make or cancel appointments    Discuss your medicines    Learn about your test results    Speak to your doctor            Additional Information About Your Visit        [email or web address] Information     [facility] gives you secure access to your electronic health record. If you see a primary care provider, you can also send messages to your care team and make appointments. If you have questions, please call your primary care clinic.  If you do not have a primary care provider, please call [phone] and they will assist you.      [facility] is an electronic gateway that provides easy, online access to your medical records. With [facility], you can request a clinic appointment, read your test results, renew a prescription or communicate with your care team.     To access your existing account, please contact your [facility] or call [phone] for assistance.        Care EveryWhere ID     This is your Care EveryWhere ID. This could be used by other organizations to access your [place]  medical records  [id number]         Blood Pressure from Last 3 Encounters:   04/20/18 121/81   03/05/18 129/85   05/09/17 153/88    Weight from Last 3 Encounters:   04/20/18 85 kg (187 lb 6.4 oz)   04/16/18 85.3 kg (188 lb)   03/05/18 85.5 kg (188 lb 8 oz)              Today, you had the following     No orders found for display       Primary Care Provider Office Phone # Fax #    Lazara Starks [phone]       [facility] [address]  [facility] [address]        Equal Access to Services     [facility] KUSUM : Hadii graciela Jansen, waforrestda cece, lizandro kaalmada harriet, nikhil crouch. So [facility] [phone].    ATENCIÓN: Si habla español, tiene a granger disposición servicios gratuitos de asistencia lingüística. [facility] [phone].    We comply with applicable federal civil rights laws and [place] laws. We do not discriminate on the basis of race, color, national origin, age, disability, sex, sexual orientation, or gender identity.            Thank you!     Thank you for choosing [facility] PREOPERATIVE ASSESSMENT CENTER  for your care. Our goal is always to provide you with excellent care. Hearing back from our patients is one way we can continue to improve our services. Please take a few minutes to complete the written survey that you may receive in the mail after your visit with us. Thank you!             Your Updated Medication List - Protect others around you: Learn how to safely use, store and throw away your medicines at www.disposemymeds.org.          This list is accurate as of 4/20/18  9:14 AM.  Always use your most recent med list.                   Brand Name Dispense Instructions for use Diagnosis    DEPO-PROVERA 150 MG/ML injection   Generic drug:  medroxyPROGESTERone      Inject 150 mg into the muscle every 3 months        WELLBUTRIN PO      Take 150 mg by mouth every morning

## 2018-04-20 NOTE — ANESTHESIA PREPROCEDURE EVALUATION
Anesthesia Evaluation     . Pt has had prior anesthetic. Type: MAC ([place]ore teeth extraction as an adult. )    No history of anesthetic complications          ROS/MED HX    ENT/Pulmonary: Comment: Reports sinus drainage and post nasal drip starting 7 days ago.  No fevers.        Neurologic: Comment: RLS>>>wellbutrin     (+)migraines,     Cardiovascular:  - neg cardiovascular ROS   (+) ----. : . . . :. . No previous cardiac testing       METS/Exercise Tolerance: Comment: Does cross fit three time a week.  Made adjustments since knee injury.   >4 METS   Hematologic:  - neg hematologic  ROS       Musculoskeletal: Comment: ACL and meniscus left tear.         GI/Hepatic:  - neg GI/hepatic ROS       Renal/Genitourinary:  - ROS Renal section negative       Endo:  - neg endo ROS       Psychiatric:     (+) psychiatric history depression (Episodic depression.  Start Wellbutrin and has stayed on as it helps her RLS.)      Infectious Disease:  - neg infectious disease ROS       Malignancy:      - no malignancy   Other:    (+) No chance of pregnancy C-spine cleared: N/A, no H/O Chronic Pain,no other significant disability                    Physical Exam  Normal systems: cardiovascular, pulmonary and dental    Airway   Mallampati: I  TM distance: >3 FB  Neck ROM: full    Dental     Cardiovascular   Rhythm and rate: regular and normal      Pulmonary    breath sounds clear to auscultation    Other findings: Procedure  MRI of left knee w/o contrast 3/23/18  EXAMINATION: MR KNEE LEFT W/O CONTRAST  3/23/2018 2:30 PM      CLINICAL HISTORY:  Suspect meniscus tear; Acute pain of left knee      COMPARISON: Radiographs dated 3/5/2018     TECHNIQUE: Multiplanar multi-sequence MR imaging was obtained using  standard sequences in three orthogonal planes without the  administration of intravenous or intra-articular gadolinium contrast  agent.     FINDINGS:      Has increased signal within the anterior cruciate ligament, there is  no edema  pattern to suggest acute ACL tear. However, the findings  suggest a subacute or chronic moderate to high-grade ACL tear.     The posterior cruciate ligaments are intact and unremarkable. The  tibial collateral ligament is preserved. Laterally, the iliotibial  band, lateral collateral ligament, biceps femoris and popliteus  tendons are intact.     In the medial compartment, there is a horizontal oblique tear of the  posterior horn of the medial meniscus that reaches the inferior  articular surface. Mild superficial fibrillation of the articular  cartilage.     In the lateral compartment, the meniscus and the articular  cartilaginous surfaces are preserved.     The extensor mechanism is intact.     The articular cartilaginous surfaces of the patellofemoral joint are  preserved.     There is a small joint effusion.          IMPRESSION:   1. Subacute or chronic appearing moderate to high-grade tear of the  ACL. There is no evidence of associated edema pattern to suggest an  acute event.  2. Horizontal oblique tear of the posterior horn of the medial  meniscus that reaches the inferior articular surface. Mild associated  artificial tibial cartilage fibrillation  3. Small joint effusion.             PAC Discussion and Assessment    ASA Classification: 1  Case is suitable for: ASC  Anesthetic techniques and relevant risks discussed: PAC Recommendations anesthetic techniques: Choice.  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 Biggs at  [facility] under choice anesthesia.  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 Biggs on 4/16/18 for complaints of left knee pain after she fell down the stairs carrying a laundry basket in January of this year.  MRI demonstrated Gr 3 rupture of the left ACL and medial meniscus tear.  The above procedure was recommended.  PAC referral for risk assessment and optimization of anesthesia with comorbid conditions of:  as above; h/o breast lumpectomy (benign); wisdom teeth extraction in 20's; RLS; depression and migraines.      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  Despite her injury she has been able to return to her cross fit work-outs with modifications.  She would like to proceed with above intervention.    She has the following specific operative considerations:     1.  Cardiology - METS>4.  Denies cardiopulmonary history or symptoms.  RCRI : No serious cardiac risks.  0.4 % risk of major adverse cardiac event.  2.  Pulmonary - no smoking       - Low risk for BLANCHE.    3.  Hematology - VTE risk:  0.26%  4.  GI - Risk of PONV score = 3.  If > 2, anti-emetic intervention recommended.  5.  Musculoskeletal - Left ACL tear and medial meniscus tear >>> above procedure recommended  6.  Neuro - RLS and migraines, take Wellbutrin DOS as prescribed.    7.  HEENT - Sinus congestion and post nasal drip onset 7 days ago.  Reports symptoms improving.  Instructed to contact Dr. Biggs's office early next week if symptoms persist.     - Anesthesia considerations:  Refer to PAC assessment in anesthesia records  - Airway:  Appears feasible       Arrival time, NPO, shower and medication instructions provided by nursing staff today.  Preparing For Your Surgery handout given.  Patient was discussed with Dr Godinez. I spent 20 minutes face to face with patient assessing, educating, counseling and/or coordinating care and examining the patient.  Of that 20 minutes, I spent greater than  50% of my time counseling and/or coordinating care.  All of the above labs and procedures I personally reviewed.    Reviewed and Signed by PAC Mid-Level Provider/Resident  Mid-Level Provider/Resident: Marika COLORADO CNP  Date: 4/20/2018  Time: 9:07 am    Attending Anesthesiologist Anesthesia Assessment:        Anesthesiologist:   Date:   Time:   Pass/Fail:   Disposition:     PAC Pharmacist Assessment:        Pharmacist:   Date:   Time:      Anesthesia Plan      History & Physical Review  History and physical reviewed and following examination; no interval change.    ASA Status:  2 .    NPO Status:  > 6 hours    Plan for General and LMA with Intravenous induction. Maintenance will be TIVA.    PONV prophylaxis:  Ondansetron (or other 5HT-3) and Dexamethasone or Solumedrol       Postoperative Care  Postoperative pain management:  Oral pain medications and Multi-modal analgesia.      Consents  Anesthetic plan, risks, benefits and alternatives discussed with:  Patient..                          .

## 2018-04-20 NOTE — PATIENT INSTRUCTIONS
Preparing for Your Surgery      Name:  Dianelys Wells   MRN:  [id number]   :  1975   Today's Date:  2018     Arriving for surgery:  Surgery date:  18  Arrival time:  10:00 am    Please come to:     [facility], 5th floor    [address] [place] [address]     Parking is available in front of the [facility] building from 5:30AM to 8:00PM.  -  Proceed to the 5th floor to check into the Ambulatory Surgery Center.              >> There will be patient concierges on the 1st and 5th floor, for assistance or an escort, if you would like.              >> Please call [phone] with any questions.    What can I eat or drink?  -  You may have solid food or milk products until midnight.  -  You may have water, apple juice or 7up/Sprite until 9:00 am.    Which medicines can I take?    -  Please take these medications the day of surgery: regular morning medications.    How do I prepare myself?  -  Take two showers: one the night before surgery; and one the morning of surgery.         Use Scrubcare or Hibiclens to wash from neck down.  You may use your own shampoo and conditioner. No other hair products.   -  Do NOT use lotion, powder, deodorant, or antiperspirant the day of your surgery.  -  Do NOT wear any makeup, fingernail polish or jewelry.  -  Bring your ID and insurance card.    AFTER YOUR SURGERY  Breathing exercises   Breathing exercises help you recover faster. Take deep breaths and let the air out slowly. This will:     Help you wake up after surgery.    Help prevent complications like pneumonia.  Preventing complications will help you go home sooner.   Nausea and vomiting   You may feel sick to your stomach after surgery; if so, let your nurse know.    Pain control:  After surgery, you may have pain. Our goal is to help you manage your pain. Pain medicine will help you feel comfortable enough to do activities that will help you heal.   These activities may include breathing exercises, walking and physical therapy.   To help your health care team treat your pain we will ask: 1) If you have pain  2) where it is located 3) describe your pain in your words  Methods of pain control include medications given by mouth, vein or by nerve block for some surgeries.  Sequential Compression Device (SCD) or Pneumo Boots:  You may need to wear SCD S on your legs or feet. These are wraps connected to a machine that pumps in air and releases it. The repeated pumping helps prevent blood clots from forming.     Questions or Concerns:    If you have questions or concerns regarding the day of surgery, please call the Preoperative Assessment Center (PAC), Monday-Friday 7AM-7PM:  [phone].    After surgery please call your surgeons office.

## 2018-04-20 NOTE — H&P
Pre-Operative H & P     CC:  Preoperative exam to assess for increased cardiopulmonary risk while undergoing surgery and anesthesia.  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 Biggs at [facility] [place] under choice anesthesia.  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 Biggs on 4/16/18 for complaints of left knee pain after she fell down the stairs carrying a laundry basket in January of this year.  MRI demonstrated Gr 3 rupture of the left ACL and medial meniscus tear.  The above procedure was recommended.  PAC referral for risk assessment and optimization of anesthesia with comorbid conditions of:  as above; h/o breast lumpectomy (benign); wisdom teeth extraction in 20's; RLS; depression; obesity and migraines.      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  Despite her injury, she has been able to return to her cross fit work-outs with modifications.  She would like to proceed with above intervention.     History is obtained from the patient and EMR.    Past Medical History  Past Medical History:   Diagnosis Date     H/O lumpectomy     left>>>adenoma     [place] teeth extracted        Past Surgical History  History reviewed. No pertinent surgical history.    Hx of Blood transfusions/reactions: deneis     Hx of abnormal bleeding or anti-platelet use: denies    Menstrual history: No LMP recorded. Patient has had an injection.    Steroid use in the last year: denies     Personal or FH with difficulty with Anesthesia:  denies    Prior to Admission Medications  Current  "Outpatient Prescriptions   Medication Sig Dispense Refill     BuPROPion HCl (WELLBUTRIN PO) Take 150 mg by mouth every morning        medroxyPROGESTERone (DEPO-PROVERA) 150 MG/ML injection Inject 150 mg into the muscle every 3 months         Allergies  Allergies   Allergen Reactions     Sulfa Drugs        Social History  Social History     Social History     Marital status: Single     Spouse name: N/A     Number of children: N/A     Years of education: N/A     Occupational History     physicain      pathology resident      Social History Main Topics     Smoking status: Never Smoker     Smokeless tobacco: Never Used     Alcohol use No     Drug use: No     Sexual activity: Not on file     Other Topics Concern     Not on file     Social History Narrative       Family History  History reviewed. No pertinent family history.      ROS/MED HX    ENT/Pulmonary: Comment: Reports sinus drainage and post nasal drip starting 7 days ago.  No fevers.        Neurologic: Comment: RLS>>>wellbutrin     (+)migraines,     Cardiovascular:  - neg cardiovascular ROS   (+) ----. : . . . :. . No previous cardiac testing       METS/Exercise Tolerance: Comment: Does cross fit three time a week.  Made adjustments since knee injury.   >4 METS   Hematologic:  - neg hematologic  ROS       Musculoskeletal: Comment: ACL and meniscus left tear.         GI/Hepatic:  - BMI >30   Renal/Genitourinary:  - ROS Renal section negative       Endo:  - neg endo ROS       Psychiatric:     (+) psychiatric history depression (Episodic depression.  Start Wellbutrin and has stayed on as it helps her RLS.)      Infectious Disease:  - neg infectious disease ROS       Malignancy:      - no malignancy   Other:    (+) No chance of pregnancy C-spine cleared: N/A, no H/O Chronic Pain,no other significant disability          Temp: 98.3  F (36.8  C) Temp src: Oral BP: 121/81 Pulse: 75   Resp: 16 SpO2: 96 %         187 lbs 6.4 oz  5' 1.42\"   Body mass index is 34.93 " kg/(m^2).       Physical Exam  Constitutional: Awake, alert, cooperative, no apparent distress, and appears stated age.  Eyes: Pupils equal, round and reactive to light, extra ocular muscles intact, sclera clear, conjunctiva normal.  HENT: Normocephalic, oral pharynx with moist mucus membranes, good dentition. No goiter appreciated.   Respiratory: Clear to auscultation bilaterally, no crackles or wheezing.  Cardiovascular: Regular rate and rhythm, normal S1 and S2, and no murmur noted.  Carotids +2, no bruits. No edema. Palpable pulses to radial  DP and PT arteries.   GI: Normal bowel sounds, soft, non-distended, non-tender, no masses palpated, no hepatosplenomegaly.  Umbilical piercing.  Lymph/Hematologic: No cervical lymphadenopathy and no supraclavicular lymphadenopathy.  Genitourinary:  na  Skin: Warm and dry.  Horizontal tattoo lumbar area  Musculoskeletal: Full ROM of neck. There is no redness, warmth, or swelling of the joints. Gross motor strength is grossly normal.    Neurologic: Awake, alert, oriented to name, place and time. Cranial nerves II-XII are grossly intact. Gait is normal.   Neuropsychiatric: Calm, cooperative. Normal affect.     Procedure  MRI of left knee w/o contrast 3/23/18  EXAMINATION: MR KNEE LEFT W/O CONTRAST  3/23/2018 2:30 PM      CLINICAL HISTORY:  Suspect meniscus tear; Acute pain of left knee      COMPARISON: Radiographs dated 3/5/2018     TECHNIQUE: Multiplanar multi-sequence MR imaging was obtained using  standard sequences in three orthogonal planes without the  administration of intravenous or intra-articular gadolinium contrast  agent.     FINDINGS:      Has increased signal within the anterior cruciate ligament, there is  no edema pattern to suggest acute ACL tear. However, the findings  suggest a subacute or chronic moderate to high-grade ACL tear.     The posterior cruciate ligaments are intact and unremarkable. The  tibial collateral ligament is preserved. Laterally, the  iliotibial  band, lateral collateral ligament, biceps femoris and popliteus  tendons are intact.     In the medial compartment, there is a horizontal oblique tear of the  posterior horn of the medial meniscus that reaches the inferior  articular surface. Mild superficial fibrillation of the articular  cartilage.     In the lateral compartment, the meniscus and the articular  cartilaginous surfaces are preserved.     The extensor mechanism is intact.     The articular cartilaginous surfaces of the patellofemoral joint are  preserved.     There is a small joint effusion.          IMPRESSION:   1. Subacute or chronic appearing moderate to high-grade tear of the  ACL. There is no evidence of associated edema pattern to suggest an  acute event.  2. Horizontal oblique tear of the posterior horn of the medial  meniscus that reaches the inferior articular surface. Mild associated  artificial tibial cartilage fibrillation  3. Small joint effusion.      ASSESSMENT and PLAN  Dianelys Wells is a 42 year old female scheduled to undergo  Examination Under Anesthesia Left Knee, Left Anterior Cruciate Ligament Reconstruction, Hamstring Autograft, Meniscus Repair on 4/27/2018 with Dr. Biggs at [facility] [place] under choice anesthesia.    She has the following specific operative considerations:     1.  Cardiology - METS>4.  Denies cardiopulmonary history or symptoms.  RCRI : No serious cardiac risks.  0.4 % risk of major adverse cardiac event.  No further cardiac evaluation needed per 2014 ACC/AHA guidelines for non-cardiac surgery.   2.  Pulmonary - no smoking       - Low risk for BLANCHE.    3.  Hematology - VTE risk:  0.26%  4.  GI - Risk of PONV score = 3.  If > 2, anti-emetic intervention recommended.  5.  Musculoskeletal - Left ACL tear and medial meniscus tear >>> above procedure recommended  6.  Neuro - RLS and migraines, take Wellbutrin DOS as prescribed.    7.  HEENT - Sinus congestion and post nasal  drip onset 7 days ago.  Reports symptoms improving.  Instructed to contact Dr. Biggs's office early next week if symptoms persist. Notified Dr. Lopez's office as well.     - Anesthesia considerations:  Refer to PAC assessment in anesthesia records  - Airway:  Appears feasible       Arrival time, NPO, shower and medication instructions provided by nursing staff today.  Preparing For Your Surgery handout given.  Patient was discussed with Dr Godinez. I spent 20 minutes face to face with patient assessing, educating, counseling and/or coordinating care and examining the patient.  Of that 20 minutes, I spent greater than 50% of my time counseling and/or coordinating care.  All of the above labs and procedures I personally reviewed.    MIROSLAVA Whitfield CNP  Preoperative Assessment Center  [facility]  Clinic and [facility] Center  Phone: [phone]  Fax: [phone]

## 2018-04-27 ENCOUNTER — ANESTHESIA (OUTPATIENT)
Dept: SURGERY | Facility: AMBULATORY SURGERY CENTER | Age: 43
End: 2018-04-27

## 2018-04-27 ENCOUNTER — HOSPITAL ENCOUNTER (OUTPATIENT)
Facility: AMBULATORY SURGERY CENTER | Age: 43
End: 2018-04-27
Attending: ORTHOPAEDIC SURGERY
Payer: COMMERCIAL

## 2018-04-27 ENCOUNTER — SURGERY (OUTPATIENT)
Age: 43
End: 2018-04-27

## 2018-04-27 VITALS
DIASTOLIC BLOOD PRESSURE: 71 MMHG | SYSTOLIC BLOOD PRESSURE: 113 MMHG | HEART RATE: 103 BPM | RESPIRATION RATE: 12 BRPM | TEMPERATURE: 98.4 F | OXYGEN SATURATION: 100 %

## 2018-04-27 DIAGNOSIS — Z98.890 STATUS POST SURGERY: Primary | ICD-10-CM

## 2018-04-27 LAB
HCG UR QL: NEGATIVE
INTERNAL QC OK POCT: YES

## 2018-04-27 DEVICE — IMP ANCHOR ARTHREX ACL TIGHTROPE REPAIR RT W/SU AR-1588RT-J: Type: IMPLANTABLE DEVICE | Site: KNEE | Status: FUNCTIONAL

## 2018-04-27 DEVICE — IMP ANCHOR ARTHREX ABS TIGHT ROPE IMP & BUTTON AR-1588TN: Type: IMPLANTABLE DEVICE | Site: KNEE | Status: FUNCTIONAL

## 2018-04-27 DEVICE — IMP BUTTON ARTHREX ABS TIGHT ROPE 11MM BUTTON AR-1588TB-3: Type: IMPLANTABLE DEVICE | Site: KNEE | Status: FUNCTIONAL

## 2018-04-27 RX ORDER — BUPIVACAINE HYDROCHLORIDE 2.5 MG/ML
INJECTION, SOLUTION EPIDURAL; INFILTRATION; INTRACAUDAL PRN
Status: DISCONTINUED | OUTPATIENT
Start: 2018-04-27 | End: 2018-04-27

## 2018-04-27 RX ORDER — BUPIVACAINE HYDROCHLORIDE AND EPINEPHRINE 2.5; 5 MG/ML; UG/ML
INJECTION, SOLUTION INFILTRATION; PERINEURAL PRN
Status: DISCONTINUED | OUTPATIENT
Start: 2018-04-27 | End: 2018-04-27 | Stop reason: HOSPADM

## 2018-04-27 RX ORDER — HYDROXYZINE HYDROCHLORIDE 25 MG/1
25 TABLET, FILM COATED ORAL
Status: COMPLETED | OUTPATIENT
Start: 2018-04-27 | End: 2018-04-27

## 2018-04-27 RX ORDER — ONDANSETRON 4 MG/1
4 TABLET, ORALLY DISINTEGRATING ORAL
Status: DISCONTINUED | OUTPATIENT
Start: 2018-04-27 | End: 2018-04-28 | Stop reason: HOSPADM

## 2018-04-27 RX ORDER — HYDROMORPHONE HYDROCHLORIDE 1 MG/ML
.3-.5 INJECTION, SOLUTION INTRAMUSCULAR; INTRAVENOUS; SUBCUTANEOUS EVERY 10 MIN PRN
Status: DISCONTINUED | OUTPATIENT
Start: 2018-04-27 | End: 2018-04-28 | Stop reason: HOSPADM

## 2018-04-27 RX ORDER — ACETAMINOPHEN 325 MG/1
975 TABLET ORAL ONCE
Status: COMPLETED | OUTPATIENT
Start: 2018-04-27 | End: 2018-04-27

## 2018-04-27 RX ORDER — FENTANYL CITRATE 50 UG/ML
25-50 INJECTION, SOLUTION INTRAMUSCULAR; INTRAVENOUS
Status: DISCONTINUED | OUTPATIENT
Start: 2018-04-27 | End: 2018-04-27 | Stop reason: HOSPADM

## 2018-04-27 RX ORDER — OXYCODONE HYDROCHLORIDE 5 MG/1
5-10 TABLET ORAL EVERY 4 HOURS PRN
Qty: 40 TABLET | Refills: 0 | Status: SHIPPED | OUTPATIENT
Start: 2018-04-27 | End: 2018-05-04

## 2018-04-27 RX ORDER — GABAPENTIN 300 MG/1
300 CAPSULE ORAL ONCE
Status: COMPLETED | OUTPATIENT
Start: 2018-04-27 | End: 2018-04-27

## 2018-04-27 RX ORDER — LIDOCAINE 40 MG/G
CREAM TOPICAL
Status: DISCONTINUED | OUTPATIENT
Start: 2018-04-27 | End: 2018-04-27 | Stop reason: HOSPADM

## 2018-04-27 RX ORDER — SODIUM CHLORIDE, SODIUM LACTATE, POTASSIUM CHLORIDE, CALCIUM CHLORIDE 600; 310; 30; 20 MG/100ML; MG/100ML; MG/100ML; MG/100ML
INJECTION, SOLUTION INTRAVENOUS CONTINUOUS
Status: DISCONTINUED | OUTPATIENT
Start: 2018-04-27 | End: 2018-04-27 | Stop reason: HOSPADM

## 2018-04-27 RX ORDER — ONDANSETRON 4 MG/1
4 TABLET, ORALLY DISINTEGRATING ORAL EVERY 30 MIN PRN
Status: DISCONTINUED | OUTPATIENT
Start: 2018-04-27 | End: 2018-04-28 | Stop reason: HOSPADM

## 2018-04-27 RX ORDER — FLUMAZENIL 0.1 MG/ML
0.2 INJECTION, SOLUTION INTRAVENOUS
Status: DISCONTINUED | OUTPATIENT
Start: 2018-04-27 | End: 2018-04-27 | Stop reason: HOSPADM

## 2018-04-27 RX ORDER — OXYCODONE HYDROCHLORIDE 5 MG/1
5 TABLET ORAL ONCE
Status: COMPLETED | OUTPATIENT
Start: 2018-04-27 | End: 2018-04-27

## 2018-04-27 RX ORDER — AMOXICILLIN 250 MG
1-2 CAPSULE ORAL 2 TIMES DAILY
Qty: 24 TABLET | Refills: 0 | Status: SHIPPED | OUTPATIENT
Start: 2018-04-27 | End: 2019-07-15

## 2018-04-27 RX ORDER — ONDANSETRON 4 MG/1
4-8 TABLET, ORALLY DISINTEGRATING ORAL EVERY 8 HOURS PRN
Qty: 4 TABLET | Refills: 0 | Status: SHIPPED | OUTPATIENT
Start: 2018-04-27 | End: 2019-07-15

## 2018-04-27 RX ORDER — NALOXONE HYDROCHLORIDE 0.4 MG/ML
.1-.4 INJECTION, SOLUTION INTRAMUSCULAR; INTRAVENOUS; SUBCUTANEOUS
Status: DISCONTINUED | OUTPATIENT
Start: 2018-04-27 | End: 2018-04-27 | Stop reason: HOSPADM

## 2018-04-27 RX ORDER — PROPOFOL 10 MG/ML
INJECTION, EMULSION INTRAVENOUS CONTINUOUS PRN
Status: DISCONTINUED | OUTPATIENT
Start: 2018-04-27 | End: 2018-04-27

## 2018-04-27 RX ORDER — SODIUM CHLORIDE, SODIUM LACTATE, POTASSIUM CHLORIDE, CALCIUM CHLORIDE 600; 310; 30; 20 MG/100ML; MG/100ML; MG/100ML; MG/100ML
INJECTION, SOLUTION INTRAVENOUS CONTINUOUS
Status: DISCONTINUED | OUTPATIENT
Start: 2018-04-27 | End: 2018-04-28 | Stop reason: HOSPADM

## 2018-04-27 RX ORDER — ONDANSETRON 2 MG/ML
4 INJECTION INTRAMUSCULAR; INTRAVENOUS EVERY 30 MIN PRN
Status: DISCONTINUED | OUTPATIENT
Start: 2018-04-27 | End: 2018-04-28 | Stop reason: HOSPADM

## 2018-04-27 RX ORDER — LIDOCAINE HYDROCHLORIDE 20 MG/ML
INJECTION, SOLUTION INFILTRATION; PERINEURAL PRN
Status: DISCONTINUED | OUTPATIENT
Start: 2018-04-27 | End: 2018-04-27

## 2018-04-27 RX ORDER — KETOROLAC TROMETHAMINE 30 MG/ML
INJECTION, SOLUTION INTRAMUSCULAR; INTRAVENOUS PRN
Status: DISCONTINUED | OUTPATIENT
Start: 2018-04-27 | End: 2018-04-27

## 2018-04-27 RX ORDER — HYDROXYZINE HYDROCHLORIDE 25 MG/1
25 TABLET, FILM COATED ORAL EVERY 6 HOURS PRN
Qty: 30 TABLET | Refills: 0 | Status: SHIPPED | OUTPATIENT
Start: 2018-04-27 | End: 2018-05-04

## 2018-04-27 RX ORDER — ACETAMINOPHEN 325 MG/1
650 TABLET ORAL EVERY 4 HOURS
Qty: 100 TABLET | Refills: 0 | Status: SHIPPED | OUTPATIENT
Start: 2018-04-27 | End: 2019-07-15

## 2018-04-27 RX ORDER — MEPERIDINE HYDROCHLORIDE 25 MG/ML
12.5 INJECTION INTRAMUSCULAR; INTRAVENOUS; SUBCUTANEOUS
Status: DISCONTINUED | OUTPATIENT
Start: 2018-04-27 | End: 2018-04-28 | Stop reason: HOSPADM

## 2018-04-27 RX ORDER — FENTANYL CITRATE 50 UG/ML
25-50 INJECTION, SOLUTION INTRAMUSCULAR; INTRAVENOUS
Status: DISCONTINUED | OUTPATIENT
Start: 2018-04-27 | End: 2018-04-28 | Stop reason: HOSPADM

## 2018-04-27 RX ORDER — EPHEDRINE SULFATE 50 MG/ML
INJECTION, SOLUTION INTRAMUSCULAR; INTRAVENOUS; SUBCUTANEOUS PRN
Status: DISCONTINUED | OUTPATIENT
Start: 2018-04-27 | End: 2018-04-27

## 2018-04-27 RX ORDER — NALOXONE HYDROCHLORIDE 0.4 MG/ML
.1-.4 INJECTION, SOLUTION INTRAMUSCULAR; INTRAVENOUS; SUBCUTANEOUS
Status: DISCONTINUED | OUTPATIENT
Start: 2018-04-27 | End: 2018-04-28 | Stop reason: HOSPADM

## 2018-04-27 RX ADMIN — FENTANYL CITRATE 50 MCG: 50 INJECTION, SOLUTION INTRAMUSCULAR; INTRAVENOUS at 11:05

## 2018-04-27 RX ADMIN — FENTANYL CITRATE 50 MCG: 50 INJECTION, SOLUTION INTRAMUSCULAR; INTRAVENOUS at 10:57

## 2018-04-27 RX ADMIN — BUPIVACAINE HYDROCHLORIDE 10 ML: 2.5 INJECTION, SOLUTION EPIDURAL; INFILTRATION; INTRACAUDAL at 11:02

## 2018-04-27 RX ADMIN — ACETAMINOPHEN 975 MG: 325 TABLET ORAL at 10:07

## 2018-04-27 RX ADMIN — GABAPENTIN 300 MG: 300 CAPSULE ORAL at 10:07

## 2018-04-27 RX ADMIN — KETOROLAC TROMETHAMINE 30 MG: 30 INJECTION, SOLUTION INTRAMUSCULAR; INTRAVENOUS at 12:24

## 2018-04-27 RX ADMIN — HYDROXYZINE HYDROCHLORIDE 25 MG: 25 TABLET, FILM COATED ORAL at 13:43

## 2018-04-27 RX ADMIN — SODIUM CHLORIDE, SODIUM LACTATE, POTASSIUM CHLORIDE, CALCIUM CHLORIDE: 600; 310; 30; 20 INJECTION, SOLUTION INTRAVENOUS at 12:39

## 2018-04-27 RX ADMIN — LIDOCAINE HYDROCHLORIDE 100 MG: 20 INJECTION, SOLUTION INFILTRATION; PERINEURAL at 10:57

## 2018-04-27 RX ADMIN — PROPOFOL 200 MCG/KG/MIN: 10 INJECTION, EMULSION INTRAVENOUS at 10:57

## 2018-04-27 RX ADMIN — BUPIVACAINE HYDROCHLORIDE 10 ML: 2.5 INJECTION, SOLUTION EPIDURAL; INFILTRATION; INTRACAUDAL at 11:06

## 2018-04-27 RX ADMIN — OXYCODONE HYDROCHLORIDE 5 MG: 5 TABLET ORAL at 12:50

## 2018-04-27 RX ADMIN — SODIUM CHLORIDE, SODIUM LACTATE, POTASSIUM CHLORIDE, CALCIUM CHLORIDE: 600; 310; 30; 20 INJECTION, SOLUTION INTRAVENOUS at 10:08

## 2018-04-27 RX ADMIN — BUPIVACAINE HYDROCHLORIDE AND EPINEPHRINE 22 ML: 2.5; 5 INJECTION, SOLUTION INFILTRATION; PERINEURAL at 12:23

## 2018-04-27 RX ADMIN — EPHEDRINE SULFATE 5 MG: 50 INJECTION, SOLUTION INTRAMUSCULAR; INTRAVENOUS; SUBCUTANEOUS at 11:11

## 2018-04-27 RX ADMIN — HYDROMORPHONE HYDROCHLORIDE 0.5 MG: 1 INJECTION, SOLUTION INTRAMUSCULAR; INTRAVENOUS; SUBCUTANEOUS at 12:50

## 2018-04-27 NOTE — DISCHARGE INSTRUCTIONS
[facility]  Home Care Following Anesthesia  For 24 hours after surgery:  1. Get plenty of rest.  A responsible adult must stay with you for at least 24 hours after you leave the surgery center.  2. Do not drive or use heavy equipment.  If you have weakness or tingling, don't drive or use heavy equipment until this feeling goes away.   3. Do not drink alcohol.   4. Avoid strenuous or risky activities.  Ask for help when climbing stairs.  5. You may feel lightheaded.  IF so, sit for a few minutes before standing.  Have someone help you get up.   6. If you have nausea (feel sick to your stomach): Drink only clear liquids such as apple juice, ginger ale, broth or 7-Up.  Rest may also help.  Be sure to drink enough fluids.  Move to a regular diet as you feel able.   7. You may have a slight fever.  Call the doctor if your fever is over 100 F (37.7 C) (taken under the tongue) or lasts longer than 24 hours.  8. You may have a dry mouth, a sore throat, muscle aches or trouble sleeping. These should go away after 24 hours.  9. Do not make important or legal decisions.   Tips for taking pain medications  To get the best pain relief possible, remember these points:    Take pain medications as directed, before pain becomes severe.    Pain medication can upset your stomach: taking it with food may help.    Constipation is a common side effect of pain medication. Drink plenty of  fluids.    Eat foods high in fiber. Take a stool softener if recommended by your doctor or pharmacist.    Do not drink alcohol, drive or operate machinery while taking pain medications.    Ask about other ways to control pain, such as with heat, ice or relaxation.    Tylenol/Acetaminophen Consumption  To help encourage the safe use of acetaminophen, the makers of TYLENOL  have lowered the maximum daily dose for single-ingredient Extra Strength TYLENOL  (acetaminophen) products sold in the [place] from 8 pills per day  "(4,000 mg) to 6 pills per day (3,000 mg). The dosing interval has also changed from 2 pills every 4-6 hours to 2 pills every 6 hours.    If you feel your pain relief is insufficient, you may take Tylenol/Acetaminophen in addition to your narcotic pain medication.     Be careful not to exceed 3,000 mg of Tylenol/Acetaminophen in a 24 hour period from all sources.    If you are taking extra strength Tylenol/acetaminophen (500 mg), the maximum dose is 6 tablets in 24 hours.    If you are taking regular strength acetaminophen (325 mg), the maximum dose is 9 tablets in 24 hours.    Call a doctor for any of the followin. Signs of infection (fever, growing tenderness at the surgery site, a large amount of drainage or bleeding, severe pain, foul-smelling drainage, redness, swelling).  2. It has been over 8 to 10 hours since surgery and you are still not able to urinate (pass water).  3. Headache for over 24 hours.      Your doctor is:  Dr. Jaun Biggs, Orthopaedics: [phone]               Or dial [phone] and ask for the resident on call for:  Orthopaedics  For emergency care, call the:  [facility] Emergency Department: [phone] (TTY for hearing impaired: [phone])    Information about liposomal bupivacaine (Exparel)    What is Liposomal Bupivacaine?    Liposomal Bupivacaine is a numbing medication that can help you manage your pain after surgery.  This medication is similar to \"novacaine,\" which is often used by the dentist.  Liposomal bupivacaine is released slowly and can help control pain for up to 72 hours.    What is the purpose of Liposomal Bupivacaine?    To manage your pain after surgery    To help you sleep better, take deep breaths, walk more comfortable, and feel up to visiting with others    How is the procedure done?    Liposomal bupivacaine is a medication given by an injection.    It is usually given right before your surgery.  If this is the case, you will be awake or sedated, but " you should experience minimal pain during the procedure.    For some people, the injection may be given at the very end of your surgery.  It all depends on the type of surgery and your situation.    The procedure usually takes about 5-15 minutes.  An ultrasound machine will help the anesthesiologist insert it in the right place or the surgeon will inject it under direct vision.     A needle is used to place the numbing medication under your skin.  It provides pain relief by numbing the tissue in the area where your surgeon will make the incision.    What can I expect?    You may experience numbness, tingling, or a feeling of heaviness around the area that was injected.    If you experience any of the follow symptoms IMMEDIATELY CALL THE REGIONAL ANESTHESIA PAIN SERVICE:    Numbness or tingling occurs in areas other than around the injection site    Blurry vision    Ringing in your ears    A metallic taste in your mouth    PAGE: Dial [phone].  When prompted, enter the following 4-digit ID number:  [id number].  You will be prompted to enter your phone number; and then enter the # sign.  The clinician on call will call you back.    OR    CALL: Dial [phone].  Let the hospital  know that you are having a problem with a nerve block and that you would like to speak to the regional anesthesia pain service right away.    You should not receive any other type of numbing medication within 4 days after receiving liposomal bupivacaine unless your anesthesiologist approves.    Post Operative Instructions: Regional Anesthetic for Lower Extremity with Liposomal Bupivacaine  General Information:   Regional anesthesia is when local anesthetic or  numbing  medication is injected around the nerves to anesthetize or  numb  the area supplied by that set of nerves. It is a type of analgesia used to control pain and decreases the need for narcotics following surgery.    Types of Regional Blocks:  Femoral: A block injected into  the groin area of the operative leg of a patient having thigh or knee surgery.  Adductor Canal: A block injected into the mid thigh of the operative leg of a patient having knee or ankle surgery.  Popliteal or Distal Sciatic: A block injected into the back of the knee of the operative leg of patients having foot or ankle surgery.   Ankle:  An anesthetic medication is injected into the ankle of the operative leg of a patient having foot or toe surgery.     Procedure:   The type of anesthesia your doctor used to numb your leg will usually not wear off for 24-48 hours, but may last as long as 72 hours. You should be careful during that period, since it is possible to injure your leg without being aware of the injury. While your leg is numb you should:    Use crutches (minimal weight bearing until your motor and strength is completely back to normal)    Avoid striking or bumping your leg    Avoid extreme hot or cold    Discomfort:  You will have a tingling and prickly sensation in your leg as the feeling begins to return; you can also expect some discomfort. The amount of discomfort is unpredictable, but if you have more pain than can be controlled with pain medication, you should notify your physician.     Pain Medicine:   Begin taking your oral pain pills before bedtime and during the night to avoid a sudden onset of pain as part of the block wears off. Do not engage in drinking, driving, or hazardous occupations while taking pain medication.     Safety Tips for Using Crutches    Crutch Fit:    Assume good standing posture with shoulders relaxed and crutch tips 6-8 inches out from the side of the foot.    The underarm pad should fall 2-3 fingers width below the armpit.    The handgrip is positioned level with the wrist to allow 30  flexion at the elbow.    Safety Tips:    Bear weight on your hands, not on your armpits.    Do not add extra padding to the underarm pad. This will, in effect, lengthen the crutches and  "increase risk of nerve injury.    Wear flat, properly fitting shoes. Do not walk in stocking feet, high heels or slippers.    Household hazards:  --Throw rugs should be removed from floors.  --Stairs should be cleared of obstacles.  --Use extra caution on slippery, highly polished, littered or uneven floor surfaces.  --Check for electric cords.    Check crutch tips for excessive wear and keep wing nuts tight.    While walking, look forward with  head up  and  eyes open.  Take equal length steps.    Use BOTH crutches.    Stairs Sequence:    UP: \"Good\" leg first, followed by  bad  leg, then crutches.    DOWN: Crutches, followed by  bad  leg, \"good\" leg.     Walking with Crutches:    Move both crutches forward at the same time.    Non-Weight Bearing (NWB):  Hold the involved leg up and swing through the crutches with the involved leg. The involved leg does not touch the floor.    Toe Touch Weight Bearing (TTWB): Move the involved leg forward. Rest it lightly on the floor for balance only. Step through the crutches with the uninvolved leg.    Partial Weight Bearing (PWB): Move the involved leg forward. Step down the weight of the leg only.  Step through the crutches with the uninvolved leg.    Weight Bearing As Tolerated (WBAT): Move the involved leg forward. Put as much pressure through the involved leg as you can tolerate comfortably. Then step through the crutches with the uninvolved leg.                "

## 2018-04-27 NOTE — IP AVS SNAPSHOT
MRN:[id number]                      After Visit Summary   4/27/2018    Dianelys Wells    MRN: [id number]           Thank you!     Thank you for choosing [facility] for your care. Our goal is always to provide you with excellent care. Hearing back from our patients is one way we can continue to improve our services. Please take a few minutes to complete the written survey that you may receive in the mail after you visit with us. Thank you!        Patient Information     Date Of Birth          1975        About your hospital stay     You were admitted on:  April 27, 2018 [facility]    You were discharged on:  April 27, 2018       Who to Call     For medical emergencies, please call 911.  For non-urgent questions about your medical care, please call your primary care provider or clinic, [phone]  For questions related to your surgery, please call your surgery clinic        Attending Provider     Provider Specialty    Jaun Biggs MD Orthopedics       Primary Care Provider Office Phone # Fax #    Lazara Starks [phone]      After Care Instructions      Diet as Tolerated       Return to diet before surgery, unless instructed otherwise.            Discharge Instructions       Review outpatient procedure discharge instructions with patient as directed by Provider            Dressing Change       Leave dressing on for 3 days, ok to remove then and shower, leave steristrips in place, band aids if needed.            Follow-up Physical Therapy appointment       Physical therapy to start at approximately 1 week postop. Please call therapy of your choice for an appointment. My clinic will place the order for physical therapy.            Ice to affected area       Ice pack to surgical site every 15 minutes per hour for 24 hours            Notify Provider       For signs and symptoms of infection: Fever greater  than 101, redness, swelling, heat at site, drainage, pus, please call [phone] during working hours. For urgent needs after hours or on weekends please call [phone] and ask for the orthopaedic resident on call. If you need a refill of pain medicine, please contact my clinic during working hours. Narcotics will not be refilled on nights or weekends.            Return to clinic       Return to clinic in 1-2 weeks as scheduled. If not previously scheduled please call [phone] for an appointment.            Shower       OK to shower POD 3, ok to get wet. Leave steri strips in place, no submerging wounds in bath or pool for 2-3 weeks.            Weight bearing - As tolerated       Weight bear as tolerated with brace on, wean from crutches when able. Sleep in brace for first week.                  Your next 10 appointments already scheduled     Apr 30, 2018  3:10 PM CDT   JENN Extremity with Gigi See PT   [facility] ([facility])    [address] [place] [address] [phone]            May 07, 2018  9:10 AM CDT   (Arrive by 8:55 AM)   RETURN KNEE with Jaun Biggs MD   [facility] ([facility] [place])    [address] [place] [address] [phone]              Further instructions from your care team       [facility]  Home Care Following Anesthesia  For 24 hours after surgery:  1. Get plenty of rest.  A responsible adult must stay with you for at least 24 hours after you leave the surgery center.  2. Do not drive or use heavy equipment.  If you have weakness or tingling, don't drive or use heavy equipment until this feeling goes away.   3. Do not drink alcohol.   4. Avoid strenuous or risky activities.  Ask for help when climbing stairs.  5. You may feel lightheaded.  IF so, sit for a few minutes before  standing.  Have someone help you get up.   6. If you have nausea (feel sick to your stomach): Drink only clear liquids such as apple juice, ginger ale, broth or 7-Up.  Rest may also help.  Be sure to drink enough fluids.  Move to a regular diet as you feel able.   7. You may have a slight fever.  Call the doctor if your fever is over 100 F (37.7 C) (taken under the tongue) or lasts longer than 24 hours.  8. You may have a dry mouth, a sore throat, muscle aches or trouble sleeping. These should go away after 24 hours.  9. Do not make important or legal decisions.   Tips for taking pain medications  To get the best pain relief possible, remember these points:    Take pain medications as directed, before pain becomes severe.    Pain medication can upset your stomach: taking it with food may help.    Constipation is a common side effect of pain medication. Drink plenty of  fluids.    Eat foods high in fiber. Take a stool softener if recommended by your doctor or pharmacist.    Do not drink alcohol, drive or operate machinery while taking pain medications.    Ask about other ways to control pain, such as with heat, ice or relaxation.    Tylenol/Acetaminophen Consumption  To help encourage the safe use of acetaminophen, the makers of TYLENOL  have lowered the maximum daily dose for single-ingredient Extra Strength TYLENOL  (acetaminophen) products sold in the [place] from 8 pills per day (4,000 mg) to 6 pills per day (3,000 mg). The dosing interval has also changed from 2 pills every 4-6 hours to 2 pills every 6 hours.    If you feel your pain relief is insufficient, you may take Tylenol/Acetaminophen in addition to your narcotic pain medication.     Be careful not to exceed 3,000 mg of Tylenol/Acetaminophen in a 24 hour period from all sources.    If you are taking extra strength Tylenol/acetaminophen (500 mg), the maximum dose is 6 tablets in 24 hours.    If you are taking regular strength acetaminophen (325 mg), the  "maximum dose is 9 tablets in 24 hours.    Call a doctor for any of the followin. Signs of infection (fever, growing tenderness at the surgery site, a large amount of drainage or bleeding, severe pain, foul-smelling drainage, redness, swelling).  2. It has been over 8 to 10 hours since surgery and you are still not able to urinate (pass water).  3. Headache for over 24 hours.      Your doctor is:  Dr. Jaun Biggs, Orthopaedics: [phone]               Or dial [phone] and ask for the resident on call for:  Orthopaedics  For emergency care, call the:  [facility] Emergency Department: [phone] (TTY for hearing impaired: [phone])    Information about liposomal bupivacaine (Exparel)    What is Liposomal Bupivacaine?    Liposomal Bupivacaine is a numbing medication that can help you manage your pain after surgery.  This medication is similar to \"novacaine,\" which is often used by the dentist.  Liposomal bupivacaine is released slowly and can help control pain for up to 72 hours.    What is the purpose of Liposomal Bupivacaine?    To manage your pain after surgery    To help you sleep better, take deep breaths, walk more comfortable, and feel up to visiting with others    How is the procedure done?    Liposomal bupivacaine is a medication given by an injection.    It is usually given right before your surgery.  If this is the case, you will be awake or sedated, but you should experience minimal pain during the procedure.    For some people, the injection may be given at the very end of your surgery.  It all depends on the type of surgery and your situation.    The procedure usually takes about 5-15 minutes.  An ultrasound machine will help the anesthesiologist insert it in the right place or the surgeon will inject it under direct vision.     A needle is used to place the numbing medication under your skin.  It provides pain relief by numbing the tissue in the area where your surgeon will make the " incision.    What can I expect?    You may experience numbness, tingling, or a feeling of heaviness around the area that was injected.    If you experience any of the follow symptoms IMMEDIATELY CALL THE REGIONAL ANESTHESIA PAIN SERVICE:    Numbness or tingling occurs in areas other than around the injection site    Blurry vision    Ringing in your ears    A metallic taste in your mouth    PAGE: Dial [phone].  When prompted, enter the following 4-digit ID number:  [id number].  You will be prompted to enter your phone number; and then enter the # sign.  The clinician on call will call you back.    OR    CALL: Dial [phone].  Let the hospital  know that you are having a problem with a nerve block and that you would like to speak to the regional anesthesia pain service right away.    You should not receive any other type of numbing medication within 4 days after receiving liposomal bupivacaine unless your anesthesiologist approves.    Post Operative Instructions: Regional Anesthetic for Lower Extremity with Liposomal Bupivacaine  General Information:   Regional anesthesia is when local anesthetic or  numbing  medication is injected around the nerves to anesthetize or  numb  the area supplied by that set of nerves. It is a type of analgesia used to control pain and decreases the need for narcotics following surgery.    Types of Regional Blocks:  Femoral: A block injected into the groin area of the operative leg of a patient having thigh or knee surgery.  Adductor Canal: A block injected into the mid thigh of the operative leg of a patient having knee or ankle surgery.  Popliteal or Distal Sciatic: A block injected into the back of the knee of the operative leg of patients having foot or ankle surgery.   Ankle:  An anesthetic medication is injected into the ankle of the operative leg of a patient having foot or toe surgery.     Procedure:   The type of anesthesia your doctor used to numb your leg will usually  not wear off for 24-48 hours, but may last as long as 72 hours. You should be careful during that period, since it is possible to injure your leg without being aware of the injury. While your leg is numb you should:    Use crutches (minimal weight bearing until your motor and strength is completely back to normal)    Avoid striking or bumping your leg    Avoid extreme hot or cold    Discomfort:  You will have a tingling and prickly sensation in your leg as the feeling begins to return; you can also expect some discomfort. The amount of discomfort is unpredictable, but if you have more pain than can be controlled with pain medication, you should notify your physician.     Pain Medicine:   Begin taking your oral pain pills before bedtime and during the night to avoid a sudden onset of pain as part of the block wears off. Do not engage in drinking, driving, or hazardous occupations while taking pain medication.     Safety Tips for Using Crutches    Crutch Fit:    Assume good standing posture with shoulders relaxed and crutch tips 6-8 inches out from the side of the foot.    The underarm pad should fall 2-3 fingers width below the armpit.    The handgrip is positioned level with the wrist to allow 30  flexion at the elbow.    Safety Tips:    Bear weight on your hands, not on your armpits.    Do not add extra padding to the underarm pad. This will, in effect, lengthen the crutches and increase risk of nerve injury.    Wear flat, properly fitting shoes. Do not walk in stocking feet, high heels or slippers.    Household hazards:  --Throw rugs should be removed from floors.  --Stairs should be cleared of obstacles.  --Use extra caution on slippery, highly polished, littered or uneven floor surfaces.  --Check for electric cords.    Check crutch tips for excessive wear and keep wing nuts tight.    While walking, look forward with  head up  and  eyes open.  Take equal length steps.    Use BOTH crutches.    Stairs  "Sequence:    UP: \"Good\" leg first, followed by  bad  leg, then crutches.    DOWN: Crutches, followed by  bad  leg, \"good\" leg.     Walking with Crutches:    Move both crutches forward at the same time.    Non-Weight Bearing (NWB):  Hold the involved leg up and swing through the crutches with the involved leg. The involved leg does not touch the floor.    Toe Touch Weight Bearing (TTWB): Move the involved leg forward. Rest it lightly on the floor for balance only. Step through the crutches with the uninvolved leg.    Partial Weight Bearing (PWB): Move the involved leg forward. Step down the weight of the leg only.  Step through the crutches with the uninvolved leg.    Weight Bearing As Tolerated (WBAT): Move the involved leg forward. Put as much pressure through the involved leg as you can tolerate comfortably. Then step through the crutches with the uninvolved leg.                  Pending Results     No orders found from 4/25/2018 to 4/28/2018.            Admission Information     Date & Time Provider Department Dept. Phone    4/27/2018 Jaun Biggs MD [facility] [phone]      Your Vitals Were     Blood Pressure Pulse Temperature Respirations Pulse Oximetry       108/72 103 98.4  F (36.9  C) 8 99%       [employer] Information     [employer] gives you secure access to your electronic health record. If you see a primary care provider, you can also send messages to your care team and make appointments. If you have questions, please call your primary care clinic.  If you do not have a primary care provider, please call [phone] and they will assist you.      [employer] is an electronic gateway that provides easy, online access to your medical records. With [employer], you can request a clinic appointment, read your test results, renew a prescription or communicate with your care team.     To access your existing account, please contact your [facility] or call [phone] for assistance.        Care EveryWhere ID     This is your Care EveryWhere ID. This could be used by other organizations to access your [facility] medical records  [id number]        Equal Access to Services     BAO NOE : Hadii aad ku hadowenmurtaza Justyna, waaxda luqadaha, qaybta kaalmada harriet, nikhil bebo aliyahbonnie briscoenemesiofernando crouch. So [facility] [phone].    ATENCIÓN: Si habla español, tiene a granger disposición servicios gratuitos de asistencia lingüística. Llame al [phone].    We comply with applicable federal civil rights laws and [place] laws. We do not discriminate on the basis of race, color, national origin, age, disability, sex, sexual orientation, or gender identity.               Review of your medicines      START taking        Dose / Directions    acetaminophen 325 MG tablet   Commonly known as:  TYLENOL   Used for:  Status post surgery        Dose:  650 mg   Take 2 tablets (650 mg) by mouth every 4 hours   Quantity:  100 tablet   Refills:  0       aspirin 325 MG EC tablet   Used for:  Status post surgery        Dose:  325 mg   Take 1 tablet (325 mg) by mouth daily To be taken for DVT Prophylaxis daily   Quantity:  28 tablet   Refills:  0       hydrOXYzine 25 MG tablet   Commonly known as:  ATARAX   Used for:  Status post surgery        Dose:  25 mg   Take 1 tablet (25 mg) by mouth every 6 hours as needed for itching (and nausea)   Quantity:  30 tablet   Refills:  0       ondansetron 4 MG ODT tab   Commonly known as:  ZOFRAN-ODT   Used for:  Status post surgery        Dose:  4-8 mg   Take 1-2 tablets (4-8 mg) by mouth every 8 hours as needed for nausea Dissolve ON the tongue.   Quantity:  4 tablet   Refills:  0       oxyCODONE IR 5 MG tablet   Commonly known as:  ROXICODONE   Used for:  Status post surgery        Dose:  5-10 mg   Take 1-2 tablets (5-10 mg) by mouth every 4 hours as needed for severe pain maximum 6 tablet(s) per day   Quantity:  40 tablet   Refills:   "0       senna-docusate 8.6-50 MG per tablet   Commonly known as:  SENOKOT-S;PERICOLACE   Used for:  Status post surgery        Dose:  1-2 tablet   Take 1-2 tablets by mouth 2 times daily Take while on oral narcotics to prevent or treat constipation.   Quantity:  24 tablet   Refills:  0         CONTINUE these medicines which have NOT CHANGED        Dose / Directions    DEPO-PROVERA 150 MG/ML injection   Generic drug:  medroxyPROGESTERone        Dose:  150 mg   Inject 150 mg into the muscle every 3 months   Refills:  0       WELLBUTRIN PO        Dose:  150 mg   Take 150 mg by mouth every morning   Refills:  0            Where to get your medicines      These medications were sent to [place], [place] - [address] [facility] [address]  [address] [place] [address] [address] [address]    Hours:  TRANSPLANT PHONE NUMBER [phone] Phone:  [phone]     acetaminophen 325 MG tablet    aspirin 325 MG EC tablet    hydrOXYzine 25 MG tablet    ondansetron 4 MG ODT tab    senna-docusate 8.6-50 MG per tablet         Some of these will need a paper prescription and others can be bought over the counter. Ask your nurse if you have questions.     Bring a paper prescription for each of these medications     oxyCODONE IR 5 MG tablet                Protect others around you: Learn how to safely use, store and throw away your medicines at www.disposemymeds.org.        Information about your nerve block     Today you received a block to numb the nerves near your surgery site.    This is a block using local anesthetic or \"numbing\" medication injected around the nerves to anesthetize or \"numb\" the area supplied by those nerves. This block is injected into the muscle layer near your surgical site. The type of anesthesia (Exparel) your anesthesia team used to numb your abdomen may give you relief for up to 72 hours.     Diet: There are no diet restrictions, but you should drink plenty of fluids, unless " you are on a fluid-restricted diet.     Activity: If your surgical site is an arm or leg you should be careful with your affected limb, since it is possible to injure your limb without being aware of it due to the numbing. Until full feeling returns, you should guard against bumping or hitting your limb, and avoid extreme hot or cold temperatures on the skin.    Pain Medication: As the block wears off, the feeling will return as a tingling or prickly sensation near your surgical site. You will experience more discomfort from your incisions as the feeling returns. You may want to take a pain pill (a narcotic or Tylenol if this was prescribed by your surgeon) when you start to experience mild pain, before the pain becomes more severe. If your pain medications do not control your pain, you should notify your surgeon. If you are taking narcotics for pain management, do not drink alcohol, drive a car, or perform hazardous activities.  If you have questions or concerns you may call your surgeon at the number provided with your discharge instructions.     Call your surgeon if you experience blurry vision, ringing in the ears or metallic taste in your mouth.         Information about OPIOIDS     PRESCRIPTION OPIOIDS: WHAT YOU NEED TO KNOW   You have a prescription for an opioid (narcotic) pain medicine. Opioids can cause addiction. If you have a history of chemical dependency of any type, you are at a higher risk of becoming addicted to opioids. Only take this medicine after all other options have been tried. Take it for as short a time and as few doses as possible.     Do not:    Drive. If you drive while taking these medicines, you could be arrested for driving under the influence (DUI).    Operate heavy machinery    Do any other dangerous activities while taking these medicines.     Drink any alcohol while taking these medicines.      Take with any other medicines that contain acetaminophen. Read all labels carefully.  Look for the word  acetaminophen  or  Tylenol.  Ask your pharmacist if you have questions or are unsure.    Store your pills in a secure place, locked if possible. We will not replace any lost or stolen medicine. If you don t finish your medicine, please throw away (dispose) as directed by your pharmacist. The [place] Pollution [employer] has more information about safe disposal: https://www.pca.[facility].[place].us/living-green/managing-unwanted-medications    All opioids tend to cause constipation. Drink plenty of water and eat foods that have a lot of fiber, such as fruits, vegetables, prune juice, apple juice and high-fiber cereal. Take a laxative (Miralax, milk of magnesia, Colace, Senna) if you don t move your bowels at least every other day.              Medication List: This is a list of all your medications and when to take them. Check marks below indicate your daily home schedule. Keep this list as a reference.      Medications           Morning Afternoon Evening Bedtime As Needed    acetaminophen 325 MG tablet   Commonly known as:  TYLENOL   Take 2 tablets (650 mg) by mouth every 4 hours   Last time this was given:  975 mg on 4/27/2018 10:07 AM                                aspirin 325 MG EC tablet   Take 1 tablet (325 mg) by mouth daily To be taken for DVT Prophylaxis daily                                DEPO-PROVERA 150 MG/ML injection   Inject 150 mg into the muscle every 3 months   Generic drug:  medroxyPROGESTERone                                hydrOXYzine 25 MG tablet   Commonly known as:  ATARAX   Take 1 tablet (25 mg) by mouth every 6 hours as needed for itching (and nausea)                                ondansetron 4 MG ODT tab   Commonly known as:  ZOFRAN-ODT   Take 1-2 tablets (4-8 mg) by mouth every 8 hours as needed for nausea Dissolve ON the tongue.                                oxyCODONE IR 5 MG tablet   Commonly known as:  ROXICODONE   Take 1-2 tablets (5-10 mg) by mouth every 4 hours as  needed for severe pain maximum 6 tablet(s) per day   Last time this was given:  5 mg on 4/27/2018 12:50 PM             5 mg tablet given at 12:50                   senna-docusate 8.6-50 MG per tablet   Commonly known as:  SENOKOT-S;PERICOLACE   Take 1-2 tablets by mouth 2 times daily Take while on oral narcotics to prevent or treat constipation.                                WELLBUTRIN PO   Take 150 mg by mouth every morning

## 2018-04-27 NOTE — ANESTHESIA PROCEDURE NOTES
Peripheral Nerve Block Procedure Note    Staff:     Anesthesiologist:  MAY MASON    Referred By:  DA RYAN  Location: OR AFTER induction  Procedure Start/Stop TImes:      4/27/2018 11:00 AM     4/27/2018 11:05 AM    patient identified, IV checked, site marked, risks and benefits discussed, informed consent, monitors and equipment checked, pre-op evaluation, at physician/surgeon's request and post-op pain management      Correct Patient: Yes      Correct Position: Yes      Correct Site: Yes      Correct Procedure: Yes      Correct Laterality:  Yes    Site Marked:  Yes  Procedure details:     Procedure:  Other (iPACK)    ASA:  2    Laterality:  Left    Position:  Supine    Sterile Prep: chloraprep, mask and sterile gloves      Local skin infiltration:  None    Needle:  Short bevel and insulated    Needle gauge:  21    Needle length (inches):  3.13    Ultrasound: Yes      Ultrasound used to identify targeted nerve, plexus, or vascular structure and placed a needle adjacent to it      Permanent Image entered into patiient's record      Abnormal pain on injection: No      Blood Aspirated: No      Paresthesias:  No    Bleeding at site: No      Bolus via:  Needle    Infusion Method:  Single Shot    Complications:  None  Assessment/Narrative:     Injection made incrementally with aspirations every (mL):  5

## 2018-04-27 NOTE — ANESTHESIA POSTPROCEDURE EVALUATION
Patient: Dianelys Wells    Procedure(s):  Examination Under Anesthesia Left Knee, Left Anterior Cruciate Ligament Reconstruction, Hamstring Autograft, Meniscus Repair  (Choice Anesthesia) - Wound Class: I-Clean   - Wound Class: I-Clean    Diagnosis:Anterior Cruciate Ligament Tear, Meniscus Tear  Diagnosis Additional Information: No value filed.    Anesthesia Type:  No value filed.    Note:  Anesthesia Post Evaluation    Patient location during evaluation: bedside  Patient participation: Able to fully participate in evaluation  Level of consciousness: awake  Pain management: adequate  Airway patency: patent  Cardiovascular status: acceptable  Respiratory status: acceptable  Hydration status: acceptable  PONV: controlled     Anesthetic complications: None          Last vitals:  Vitals:    04/27/18 1250 04/27/18 1255 04/27/18 1300   BP:   108/72   Pulse:      Resp: 13 8 8   Temp:   36.9  C (98.4  F)   SpO2: 94% 94% 99%         Electronically Signed By: Pavan Springer MD, MD  April 27, 2018  1:09 PM

## 2018-04-27 NOTE — ANESTHESIA PROCEDURE NOTES
Peripheral Nerve Block Procedure Note    Staff:     Anesthesiologist:  MAY MASON    Referred By:  DA RYAN  Location: OR AFTER induction  Procedure Start/Stop TImes:      4/27/2018 11:05 AM     4/27/2018 11:10 AM    patient identified, IV checked, site marked, risks and benefits discussed, informed consent, monitors and equipment checked, pre-op evaluation, at physician/surgeon's request and post-op pain management      Correct Patient: Yes      Correct Position: Yes      Correct Site: Yes      Correct Procedure: Yes      Correct Laterality:  Yes    Site Marked:  Yes  Procedure details:     Procedure:  Adductor canal    ASA:  2    Laterality:  Left    Position:  Supine    Sterile Prep: chloraprep, mask and sterile gloves      Local skin infiltration:  None    Needle:  Short bevel and insulated    Needle gauge:  21    Needle length (inches):  3.13    Ultrasound: Yes      Ultrasound used to identify targeted nerve, plexus, or vascular structure and placed a needle adjacent to it      Permanent Image entered into patiient's record      Abnormal pain on injection: No      Blood Aspirated: No      Paresthesias:  No    Bleeding at site: No      Bolus via:  Needle    Infusion Method:  Single Shot    Complications:  None  Assessment/Narrative:     Injection made incrementally with aspirations every (mL):  5

## 2018-04-27 NOTE — IP AVS SNAPSHOT
[facility]    [address] [place] [id number]    Phone:  [phone]    Fax:  [phone]                                       After Visit Summary   4/27/2018    Dianelys Wells    MRN: [id number]           After Visit Summary Signature Page     I have received my discharge instructions, and my questions have been answered. I have discussed any challenges I see with this plan with the nurse or doctor.    ..........................................................................................................................................  Patient/Patient Representative Signature      ..........................................................................................................................................  Patient Representative Print Name and Relationship to Patient    ..................................................               ................................................  Date                                            Time    ..........................................................................................................................................  Reviewed by Signature/Title    ...................................................              ..............................................  Date                                                            Time

## 2018-04-27 NOTE — ANESTHESIA CARE TRANSFER NOTE
Patient: Dianelys Wells    Procedure(s):  Examination Under Anesthesia Left Knee, Left Anterior Cruciate Ligament Reconstruction, Hamstring Autograft, Meniscus Repair  (Choice Anesthesia) - Wound Class: I-Clean   - Wound Class: I-Clean    Diagnosis: Anterior Cruciate Ligament Tear, Meniscus Tear  Diagnosis Additional Information: No value filed.    Anesthesia Type:   No value filed.     Note:  Airway :Face Mask  Patient transferred to:PACU  Comments: Uneventful transport to PACU   Report to RN - dayami  Exchanging well; color pink/natl  Pt responds appropriately to command  IV patent  Lips/teeth/dentition as preop status  Questions answered  /71   ST  TAT 97  RR 16  Sat 98Handoff Report: Identifed the Patient, Identified the Reponsible Provider, Reviewed the pertinent medical history, Discussed the surgical course, Reviewed Intra-OP anesthesia mangement and issues during anesthesia, Set expectations for post-procedure period and Allowed opportunity for questions and acknowledgement of understanding      Vitals: (Last set prior to Anesthesia Care Transfer)    CRNA VITALS  4/27/2018 1205 - 4/27/2018 1243      4/27/2018             Pulse: 109    Ht Rate: 110    SpO2: 92 %    Resp Rate (observed): (!)  2                Electronically Signed By: MIROSLAVA VALENTIN CRNA  April 27, 2018  12:43 PM

## 2018-05-02 ENCOUNTER — THERAPY VISIT (OUTPATIENT)
Dept: PHYSICAL THERAPY | Facility: CLINIC | Age: 43
End: 2018-05-02
Payer: COMMERCIAL

## 2018-05-02 DIAGNOSIS — M25.562 KNEE PAIN, LEFT: Primary | ICD-10-CM

## 2018-05-02 DIAGNOSIS — R60.9 EDEMA: ICD-10-CM

## 2018-05-02 DIAGNOSIS — Z98.890 S/P ACL RECONSTRUCTION: ICD-10-CM

## 2018-05-02 PROCEDURE — 97110 THERAPEUTIC EXERCISES: CPT | Mod: GP | Performed by: PHYSICAL THERAPIST

## 2018-05-02 PROCEDURE — 97016 VASOPNEUMATIC DEVICE THERAPY: CPT | Mod: GP | Performed by: PHYSICAL THERAPIST

## 2018-05-02 PROCEDURE — 97161 PT EVAL LOW COMPLEX 20 MIN: CPT | Mod: GP | Performed by: PHYSICAL THERAPIST

## 2018-05-02 ASSESSMENT — ACTIVITIES OF DAILY LIVING (ADL)
SWELLING: NOT ANSWERED
LIMPING: THE SYMPTOM AFFECTS MY ACTIVITY SEVERELY
KNEEL ON THE FRONT OF YOUR KNEE: I AM UNABLE TO DO THE ACTIVITY
KNEE_ACTIVITY_OF_DAILY_LIVING_SUM: 8
GO UP STAIRS: ACTIVITY IS VERY DIFFICULT
STIFFNESS: NOT ANSWERED
HOW_WOULD_YOU_RATE_THE_CURRENT_FUNCTION_OF_YOUR_KNEE_DURING_YOUR_USUAL_DAILY_ACTIVITIES_ON_A_SCALE_FROM_0_TO_100_WITH_100_BEING_YOUR_LEVEL_OF_KNEE_FUNCTION_PRIOR_TO_YOUR_INJURY_AND_0_BEING_THE_INABILITY_TO_PERFORM_ANY_OF_YOUR_USUAL_DAILY_ACTIVITIES?: 10
WEAKNESS: THE SYMPTOM AFFECTS MY ACTIVITY SEVERELY
WALK: ACTIVITY IS VERY DIFFICULT
AS_A_RESULT_OF_YOUR_KNEE_INJURY,_HOW_WOULD_YOU_RATE_YOUR_CURRENT_LEVEL_OF_DAILY_ACTIVITY?: SEVERELY ABNORMAL
SIT WITH YOUR KNEE BENT: I AM UNABLE TO DO THE ACTIVITY
PAIN: THE SYMPTOM AFFECTS MY ACTIVITY SEVERELY
HOW_WOULD_YOU_RATE_THE_OVERALL_FUNCTION_OF_YOUR_KNEE_DURING_YOUR_USUAL_DAILY_ACTIVITIES?: SEVERELY ABNORMAL
GIVING WAY, BUCKLING OR SHIFTING OF KNEE: NOT ANSWERED
RISE FROM A CHAIR: ACTIVITY IS VERY DIFFICULT
SQUAT: I AM UNABLE TO DO THE ACTIVITY
STAND: ACTIVITY IS VERY DIFFICULT
GO DOWN STAIRS: ACTIVITY IS VERY DIFFICULT

## 2018-05-02 NOTE — MR AVS SNAPSHOT
After Visit Summary   5/2/2018    Dianelys Wells    MRN: [id number]           Patient Information     Date Of Birth          1975        Visit Information        Provider Department      5/2/2018 2:00 PM Gigi See PT [facility]        Today's Diagnoses     Knee pain, left    -  1    S/P ACL reconstruction        Edema           Follow-ups after your visit        Your next 10 appointments already scheduled     May 07, 2018  9:10 AM CDT   (Arrive by 8:55 AM)   RETURN KNEE with Jaun Biggs MD   [facility] ([facility])    [address] [place] [address] [id number] [phone]            May 08, 2018  1:30 PM CDT   [facility] Extremity with MAKENZIE [facility] ([facility])    [address] [place] [id number] [phone]            May 10, 2018  1:30 PM CDT   [facility] Extremity with MAKENZIE [facility] ([facility])    [address] [place] [address] [phone] [phone]            May 14, 2018  1:10 PM CDT   [facility] Extremity with ALBERTO [facility] ([facility])    [address] [place] [address] [phone] [phone]            May 17, 2018  1:30 PM CDT   [facility] Extremity with MAKENZIE [facility] ([facility])    [address] [place] [address] [phone]            May 22, 2018  2:10 PM CDT   [facility]ENN Extremity with MAKENZIE [facility] ([facility])    [address] [place] [address] [phone]            May 24, 2018  1:30 PM CDT   [facility] Extremity [facility]ZIE [facility] ([facility])    [address] [place] [phone]            May 30, 2018  1:20 PM CDT   JENN Extremity with Gigi See PT   [facility] ([facility])    [address] [place] [phone]            Jun 01, 2018  1:30 PM CDT   JENN Extremity with Paula Bain PT   [facility] ([facility])    [address] [place] [phone]            Jun 06, 2018 12:40 PM CDT   JENN Extremity with Gigi See PT   [facility] ([facility])    [address] [place] [phone]              Who to contact     If you have questions or need follow up information about today's clinic visit or your schedule please contact [facility] directly at [phone].  Normal or non-critical lab and imaging results will be communicated to you by MyChart, letter or phone within 4 business days after the clinic has received the results. If you do not hear from us within 7 days, please contact the clinic through [email or web address] or phone. If you have a critical or abnormal lab result, we will notify you by phone as soon as possible.  Submit refill requests through [employer] or call your pharmacy and they will forward the refill request to us. Please allow 3 business days for your refill to be completed.          Additional Information About Your Visit        [email or web address] Information     [employer] gives you secure access to your electronic health record. If you see a primary care provider, you can also send messages to your care team and make appointments. If you have questions, please call your primary care clinic.  If you do not have a primary care provider, please call  [phone] and they will assist you.        Care EveryWhere ID     This is your Care EveryWhere ID. This could be used by other organizations to access your [facility] medical records  [id number]         Blood Pressure from Last 3 Encounters:   04/27/18 113/71   04/20/18 121/81   03/05/18 129/85    Weight from Last 3 Encounters:   04/20/18 85 kg (187 lb 6.4 oz)   04/16/18 85.3 kg (188 lb)   03/05/18 85.5 kg (188 lb 8 oz)              We Performed the Following     C VASOPNEUMATIC DEVICE     HC PT EVAL, LOW COMPLEXITY     [facility] INITIAL EVAL REPORT     THERAPEUTIC EXERCISES        Primary Care Provider Office Phone # Fax #    Lazara Starks [phone]       [facility] [address]  [facility] [address]        Equal Access to Services     DAVID NOE : Hadii aad ku hadasho Soomaali, waaxda luqadaha, qaybta kaalmada adeegyada, waxay idiin hayaan arben cruz . So [facility] [phone].    ATENCIÓN: Si habla español, tiene a granger disposición servicios gratuitos de asistencia lingüística. Llame al [phone].    We comply with applicable federal civil rights laws and [place] laws. We do not discriminate on the basis of race, color, national origin, age, disability, sex, sexual orientation, or gender identity.            Thank you!     Thank you for choosing [facility]  for your care. Our goal is always to provide you with excellent care. Hearing back from our patients is one way we can continue to improve our services. Please take a few minutes to complete the written survey that you may receive in the mail after your visit with us. Thank you!             Your Updated Medication List - Protect others around you: Learn how to safely use, store and throw away your medicines at www.disposemymeds.org.          This list is accurate as of 5/2/18 11:59 PM.  Always use your most recent med list.                   Brand Name Dispense Instructions for use Diagnosis     acetaminophen 325 MG tablet    TYLENOL    100 tablet    Take 2 tablets (650 mg) by mouth every 4 hours    Status post surgery       aspirin 325 MG EC tablet     28 tablet    Take 1 tablet (325 mg) by mouth daily To be taken for DVT Prophylaxis daily    Status post surgery       DEPO-PROVERA 150 MG/ML injection   Generic drug:  medroxyPROGESTERone      Inject 150 mg into the muscle every 3 months        hydrOXYzine 25 MG tablet    ATARAX    30 tablet    Take 1 tablet (25 mg) by mouth every 6 hours as needed for itching (and nausea)    Status post surgery       ondansetron 4 MG ODT tab    ZOFRAN-ODT    4 tablet    Take 1-2 tablets (4-8 mg) by mouth every 8 hours as needed for nausea Dissolve ON the tongue.    Status post surgery       oxyCODONE IR 5 MG tablet    ROXICODONE    40 tablet    Take 1-2 tablets (5-10 mg) by mouth every 4 hours as needed for severe pain maximum 6 tablet(s) per day    Status post surgery       senna-docusate 8.6-50 MG per tablet    SENOKOT-S;PERICOLACE    24 tablet    Take 1-2 tablets by mouth 2 times daily Take while on oral narcotics to prevent or treat constipation.    Status post surgery       WELLBUTRIN PO      Take 150 mg by mouth every morning

## 2018-05-02 NOTE — PROGRESS NOTES
Physical Therapy Initial Evaluation  May 2, 2018   Precautions/Restrictions/MD instructions: Per Dr. Biggs  1. Weightbearing as tolerated  2. Knee immobilizer ×1 week at 1 week wean when able  3. Wean from crutches when able  4. No motion restrictions  5. Goal will be to walk to my clinic at 6 weeks no brace no crutches  6. Aspirin 325 mg for DVT prophylaxis ×4 weeks  7. No running until 3 months  8. No sports until 6 months      Subjective: Pt is 5 days s/p L ACL reconstruction (hamstring autograft) with small medial meniscus repair. She states she is doing ok - but in quite a bit of pain. No signs of infection or issues with the incisions.    Date of Onset: 4/27/18  C/C: Post-operative pain.   Quality of pain is dull and aching. Pains are described as intermittent in nature. Pain is worse: as day goes on. Pain is rated 2-4/10.   History of symptoms: Pains began suddenly as the result of L ACLR / medial mensicus repair. Since onset, symptoms are improving.  Worsened by: activity.    Alleviated by: Rest, Ice and Elevation.    Pertinent medical/surgical history: n/a. Imaging: MRI. Current occupational status: physician. Patient's goals are: decrease pain, return to running. Return to MD:  5/7/18    Therapist Impression:   Dianelys Wells is a 42 year old female 5 days s/p L ACLR hamstring autograft with L medial meniscus repair - following standard ACLR protocol (see top of note for MD instructions), with typical post-operative pain, swelling, and functional limitations. These impairments limit her ability to stand, walk, drive, work, and perform many ADLs. Skilled PT services are necessary in order to reduce impairments and improve independent function.    Objective:  KNEE:    PROM:   L  R   Hyperextension  3   Extension 0    Flexion 60 130     Palpation: Sutures intact - no signs of infection      Functional: WBAT with bilateral axillary crutches          Assessment/Plan:    The patient is a 42 year old female  with chief complaint of L knee pain.    The patient has the following significant findings with corresponding treatment plan.  Diagnosis 1:  S/p L ACLR with medial meniscus repair    Pain -  hot/cold therapy, electric stimulation, manual therapy, splint/taping/bracing/orthotics, education and home program  Decreased ROM/flexibility - manual therapy, therapeutic exercise, therapeutic activity and home program  Decreased joint mobility - manual therapy, therapeutic exercise, therapeutic activity and home program  Decreased strength - therapeutic exercise, therapeutic activities and home program  Impaired balance - neuro re-education, gait training, therapeutic activities, adaptive equipment/assistive device and home program  Edema - vasopneumatics, cold therapy and cryocuff  Impaired gait - gait training, assistive devices and home program  Decreased function - therapeutic activities, home program and functional performance testing      Therapy Evaluation Codes:   1) History comprised of:   Personal factors that impact the plan of care:      None.    Comorbidity factors that impact the plan of care are:      Overweight.     Medications impacting care: Pain.  2) Examination of Body Systems comprised of:   Body structures and functions that impact the plan of care:      Knee.   Activity limitations that impact the plan of care are:      Bathing, Driving, Dressing, Jumping, Lifting, Running, Squatting/kneeling, Stairs, Standing and Walking.   Clinical presentation characteristics are:    Stable/Uncomplicated.  3) Presentation comprised of:   Presentation scored as Low complexity with uncomplicated characteristics..  4) Decision-Making    Low complexity using standardized patient assessment instrument and/or measureable assessment of functional outcome.  Cumulative Therapy Evaluation is: Low complexity.    Previous and current functional limitations:  (See Goal Flow Sheet for this information)    Short term and Long term  goals: (See Goal Flow Sheet for this information)     Communication ability:  Patient appears to be able to clearly communicate and understand verbal and written communication and follow directions correctly.  Treatment Explanation - The following has been discussed with the patient: RX ordered/plan of care, anticipated outcomes, and possible risks and side effects.  This patient would benefit from PT intervention to resume normal activities.   Rehab potential is good.    Frequency:  2 X week, once daily, tapering to 1 X week  Duration:  for 20 weeks  Discharge Plan: Achieve all LTGs, be independent in home treatment program, and reach maximal therapeutic benefit.    Please refer to the daily flowsheet for treatment today, total treatment time and time spent performing 1:1 timed codes.

## 2018-05-03 PROBLEM — Z98.890 S/P ACL RECONSTRUCTION: Status: ACTIVE | Noted: 2018-05-03

## 2018-05-03 PROBLEM — M25.562 KNEE PAIN, LEFT: Status: ACTIVE | Noted: 2018-05-03

## 2018-05-03 PROBLEM — R60.9 EDEMA: Status: ACTIVE | Noted: 2018-05-03

## 2018-05-04 ENCOUNTER — TELEPHONE (OUTPATIENT)
Dept: ORTHOPEDICS | Facility: CLINIC | Age: 43
End: 2018-05-04

## 2018-05-04 DIAGNOSIS — Z98.890 S/P ACL RECONSTRUCTION: Primary | ICD-10-CM

## 2018-05-04 DIAGNOSIS — Z98.890 STATUS POST SURGERY: ICD-10-CM

## 2018-05-04 RX ORDER — HYDROXYZINE HYDROCHLORIDE 25 MG/1
25 TABLET, FILM COATED ORAL EVERY 6 HOURS PRN
Qty: 30 TABLET | Refills: 0 | Status: SHIPPED | OUTPATIENT
Start: 2018-05-04 | End: 2019-07-15

## 2018-05-04 RX ORDER — OXYCODONE HYDROCHLORIDE 5 MG/1
5-10 TABLET ORAL EVERY 6 HOURS PRN
Qty: 20 TABLET | Refills: 0 | Status: SHIPPED | OUTPATIENT
Start: 2018-05-04 | End: 2019-07-15

## 2018-05-04 NOTE — TELEPHONE ENCOUNTER
Returned call to patient regarding request for pain medication. She is s/p Examination under anesthesia left knee, Left knee arthroscopy, Left knee ACL reconstruction hamstring autograft, All inside repair of the medial meniscus left knee DOS 4/27/18 with Dr. Biggs. She reports her pain to be well controlled for the most part, typically at a 2/10. She states that after therapy and if she is more active she has a stabbing pain and this is when she takes the pain medication. She states that yesterday she took 3 tablets and today hasn't taken anything. Refill completed per standing order and brought down stairs to the pharmacy as requested.

## 2018-05-04 NOTE — TELEPHONE ENCOUNTER
[facility]    Phone Message    May a detailed message be left on voicemail: yes    Reason for Call: Other: Pt requesting more oxycodone for the weekend     Action Taken: Message routed to:  [facility]

## 2018-05-07 ENCOUNTER — OFFICE VISIT (OUTPATIENT)
Dept: ORTHOPEDICS | Facility: CLINIC | Age: 43
End: 2018-05-07
Payer: COMMERCIAL

## 2018-05-07 ENCOUNTER — RADIANT APPOINTMENT (OUTPATIENT)
Dept: GENERAL RADIOLOGY | Facility: CLINIC | Age: 43
End: 2018-05-07
Payer: COMMERCIAL

## 2018-05-07 DIAGNOSIS — S83.512A RUPTURE OF ANTERIOR CRUCIATE LIGAMENT OF LEFT KNEE, INITIAL ENCOUNTER: Primary | ICD-10-CM

## 2018-05-07 DIAGNOSIS — Z98.890 S/P ACL RECONSTRUCTION: ICD-10-CM

## 2018-05-07 NOTE — PROGRESS NOTES
Orthopedic follow-up clinic  Date of surgery: 4/27/2018 left knee ACL reconstruction with hamstring autograft, all inside repair medial meniscus left knee    Interval history patient returns today 1 week status post the above-mentioned procedure she has done well in the postoperative period.  She has minimal pain she does have some expected postoperative numbness lateral to her anterior knee incision otherwise has been compliant with motion and brace wearing.  Patient has questions today with regard to return to work she has no other complaints or concerns at this juncture.    Physical examination  Patient is a pleasant alert freely conversant in no acute distress  Examination of the patient's left lower extremity notable for range of motion 0-90  minimal effusion subacute ecchymoses consistent with patient's postoperative status incisions are healing well without active drainage sensation intact to light touch saphenous sural tibial deep and superficial peroneal nerve distributions dorsalis pedis pulses palpable and regular.    Imaging  2 views left knee AP and lateral notable for postoperative changes reflecting ACL reconstruction with appropriate tunnel position Endobutton fixation.      Assessment  42-year-old female status post left knee ACL reconstruction with hamstring autograft and all inside medial meniscus repair 4/27/2018 doing well postoperatively.      Weightbearing: WBAT    Range of Motion: No range of motion restrictions    Pain Medications: We reviewed post-operative pain medications at today's visit. The patient should continue to wean  all opioid pain medications.    Extension: We reviewed the importance of full knee extension and demonstrated the relevant exercises as appropriate    Crutches/Brace: Patient should wean from crutches as needed as quadricep recovers     Patient given handicap permit and work letter with return date of 5/14    Acitivity Restrictions:    Discussed that this is the  dangerous time after ACL reconstruction    Reviewed activity restrictions at today's visit    Goal to progress strength and motion to allow straight line running at three months from the date of surgery    Follow up: 4 weeks with no new radiographs needed  Answers for HPI/ROS submitted by the patient on 4/30/2018   General Symptoms: No  Skin Symptoms: No  HENT Symptoms: No  EYE SYMPTOMS: No  HEART SYMPTOMS: No  LUNG SYMPTOMS: No  INTESTINAL SYMPTOMS: No  URINARY SYMPTOMS: No  GYNECOLOGIC SYMPTOMS: No  BREAST SYMPTOMS: No  SKELETAL SYMPTOMS: No  BLOOD SYMPTOMS: No  NERVOUS SYSTEM SYMPTOMS: No  MENTAL HEALTH SYMPTOMS: No

## 2018-05-07 NOTE — LETTER
5/7/2018       RE: Dianelys Wells  [address]  [facility] [address]     Dear Colleague,    Thank you for referring your patient, Dianelys Wells, to the [facility] at [facility]. Please see a copy of my visit note below.    Orthopedic follow-up clinic  Date of surgery: 4/27/2018 left knee ACL reconstruction with hamstring autograft, all inside repair medial meniscus left knee    Interval history patient returns today 1 week status post the above-mentioned procedure she has done well in the postoperative period.  She has minimal pain she does have some expected postoperative numbness lateral to her anterior knee incision otherwise has been compliant with motion and brace wearing.  Patient has questions today with regard to return to work she has no other complaints or concerns at this juncture.    Physical examination  Patient is a pleasant alert freely conversant in no acute distress  Examination of the patient's left lower extremity notable for range of motion 0-90  minimal effusion subacute ecchymoses consistent with patient's postoperative status incisions are healing well without active drainage sensation intact to light touch saphenous sural tibial deep and superficial peroneal nerve distributions dorsalis pedis pulses palpable and regular.    Imaging  2 views left knee AP and lateral notable for postoperative changes reflecting ACL reconstruction with appropriate tunnel position Endobutton fixation.      Assessment  42-year-old female status post left knee ACL reconstruction with hamstring autograft and all inside medial meniscus repair 4/27/2018 doing well postoperatively.      Weightbearing: WBAT    Range of Motion: No range of motion restrictions    Pain Medications: We reviewed post-operative pain medications at today's visit. The patient should continue to wean  all opioid pain medications.    Extension: We reviewed the importance of full  knee extension and demonstrated the relevant exercises as appropriate    Crutches/Brace: Patient should wean from crutches as needed as quadricep recovers     Patient given handicap permit and work letter with return date of 5/14    Acitivity Restrictions:    Discussed that this is the dangerous time after ACL reconstruction    Reviewed activity restrictions at today's visit    Goal to progress strength and motion to allow straight line running at three months from the date of surgery    Follow up: 4 weeks with no new radiographs needed    Patient seen and examined with the resident.     Assesment: acl tear left knee, mm tear    Plan: wbat, rom as an, wean from crutches and brace, f/u 6 weeks    I agree with history, physical and imaging as well as the assessment and plan as detailed by Dr. Rosas.       Again, thank you for allowing me to participate in the care of your patient.      Sincerely,    Jaun Biggs MD

## 2018-05-07 NOTE — PROGRESS NOTES
Patient seen and examined with the resident.     Assesment: acl tear left knee, mm tear    Plan: wbat, rom as an, wean from crutches and brace, f/u 6 weeks    I agree with history, physical and imaging as well as the assessment and plan as detailed by Dr. Rosas.

## 2018-05-07 NOTE — MR AVS SNAPSHOT
After Visit Summary   5/7/2018    Dianelys Wells    MRN: [id number]           Patient Information     Date Of Birth          1975        Visit Information        Provider Department      5/7/2018 9:10 AM Jaun Biggs MD [facility]        Today's Diagnoses     Rupture of anterior cruciate ligament of left knee, initial encounter    -  1       Follow-ups after your visit        Your next 10 appointments already scheduled     May 08, 2018  1:30 PM CDT   JENN Extremity with MAKENZIE Catherine [facility] Physical Therapy JENN ([facility])    [address] [place] [id number] [phone]            May 10, 2018  1:30 PM CDT   JENN Extremity with MAKENZIE Catherine [facility] Physical Therapy JENN ([facility])    [address] [place] [address] [phone]            May 14, 2018  1:10 PM CDT   JENN Extremity with ALBERTO Watkins [facility] Physical Therapy JENN ([facility])    [address] [place] [address] [phone]            May 17, 2018  1:30 PM CDT   JENN Extremity with MAKENZIE Catherine [facility] Physical Therapy JENN ([facility])    [address] [place] [address] [phone]            May 22, 2018  2:10 PM CDT   JENN Extremity with MAKENZIE Catherine [facility] Physical Therapy JENN ([facility])    [address] [place] [address] [phone]            May 24, 2018  1:30 PM CDT   JENN Extremity with MAKENZIE Catherine [facility] Physical Therapy JENN ([facility])    [address] [place] [address] [phone]            May 30, 2018  1:20 PM CDT   JENN Extremity with ALBERTO Tyson [facility]  Physical Therapy JENN ([facility])    [address] [place] [id number] [phone]            Jun 01, 2018  1:30 PM CDT   JENN Extremity with Paula Bain PT   [facility] Physical Therapy JENN ([facility])    [address] [place] [id number] [phone]            Jun 06, 2018 12:40 PM CDT   JENN Extremity with Gigi See PT   [facility] Physical Therapy JENN ([facility])    [address] [place] [id number] [phone]            Jun 13, 2018 12:40 PM CDT   JENN Extremity with Gigi See PT   [facility] Physical Therapy JENN ([facility])    [address] [place] [id number] [phone]              Who to contact     Please call your clinic at [phone] to:    Ask questions about your health    Make or cancel appointments    Discuss your medicines    Learn about your test results    Speak to your doctor            Additional Information About Your Visit        [employer] Information     [employer] gives you secure access to your electronic health record. If you see a primary care provider, you can also send messages to your care team and make appointments. If you have questions, please call your primary care clinic.  If you do not have a primary care provider, please call [phone] and they will assist you.      [employer] is an electronic gateway that provides easy, online access to your medical records. With [employer], you can request a clinic appointment, read your test results, renew a prescription or communicate with your care team.     To access your existing account, please contact your [facility] or call [phone] for assistance.        Care EveryWhere ID     This is your Care EveryWhere ID. This could be used by other organizations to  access your [place] medical records  [id number]         Blood Pressure from Last 3 Encounters:   04/27/18 113/71   04/20/18 121/81   03/05/18 129/85    Weight from Last 3 Encounters:   04/20/18 187 lb 6.4 oz (85 kg)   04/16/18 188 lb (85.3 kg)   03/05/18 188 lb 8 oz (85.5 kg)              Today, you had the following     No orders found for display       Primary Care Provider Office Phone # Fax #    Lazara Starks [phone]       [facility] [address]  [facility] [address]        Equal Access to Services     [facility] : Hadii graciela Jansen, waforrestda cece, qaybta kaalmada harriet, nikhil crouch. So [facility] [phone].    ATENCIÓN: Si habla español, tiene a granger disposición servicios gratuitos de asistencia lingüística. Llame al [phone].    We comply with applicable federal civil rights laws and [place] laws. We do not discriminate on the basis of race, color, national origin, age, disability, sex, sexual orientation, or gender identity.            Thank you!     Thank you for choosing [facility]  for your care. Our goal is always to provide you with excellent care. Hearing back from our patients is one way we can continue to improve our services. Please take a few minutes to complete the written survey that you may receive in the mail after your visit with us. Thank you!             Your Updated Medication List - Protect others around you: Learn how to safely use, store and throw away your medicines at www.disposemymeds.org.          This list is accurate as of 5/7/18 10:59 AM.  Always use your most recent med list.                   Brand Name Dispense Instructions for use Diagnosis    acetaminophen 325 MG tablet    TYLENOL    100 tablet    Take 2 tablets (650 mg) by mouth every 4 hours    Status post surgery       ALEVE PO      Take 220 mg by mouth        aspirin 325 MG EC tablet     28 tablet    Take 1 tablet (325  mg) by mouth daily To be taken for DVT Prophylaxis daily    Status post surgery       DEPO-PROVERA 150 MG/ML injection   Generic drug:  medroxyPROGESTERone      Inject 150 mg into the muscle every 3 months        hydrOXYzine 25 MG tablet    ATARAX    30 tablet    Take 1 tablet (25 mg) by mouth every 6 hours as needed for itching (and nausea)    Status post surgery       ondansetron 4 MG ODT tab    ZOFRAN-ODT    4 tablet    Take 1-2 tablets (4-8 mg) by mouth every 8 hours as needed for nausea Dissolve ON the tongue.    Status post surgery       oxyCODONE IR 5 MG tablet    ROXICODONE    20 tablet    Take 1-2 tablets (5-10 mg) by mouth every 6 hours as needed for severe pain maximum 6 tablet(s) per day    Status post surgery       senna-docusate 8.6-50 MG per tablet    SENOKOT-S;PERICOLACE    24 tablet    Take 1-2 tablets by mouth 2 times daily Take while on oral narcotics to prevent or treat constipation.    Status post surgery       WELLBUTRIN PO      Take 150 mg by mouth every morning

## 2018-05-07 NOTE — LETTER
Return to Work  May 7, 2018     Seen today: yes    Patient:  Dianelys Wells  :   1975  MRN:     [id number]  Physician: DA BIGGS    Dianelys Wells may return to work on Monday May 14th, 2018, following her left knee surgery. She will follow up with me in 6 weeks.         Electronically signed by Da Biggs MD & Annemarie Prabhakar ATC

## 2018-05-08 ENCOUNTER — THERAPY VISIT (OUTPATIENT)
Dept: PHYSICAL THERAPY | Facility: CLINIC | Age: 43
End: 2018-05-08
Payer: COMMERCIAL

## 2018-05-08 DIAGNOSIS — R60.9 EDEMA: ICD-10-CM

## 2018-05-08 DIAGNOSIS — Z98.890 S/P ACL RECONSTRUCTION: ICD-10-CM

## 2018-05-08 DIAGNOSIS — M25.562 KNEE PAIN, LEFT: ICD-10-CM

## 2018-05-08 PROCEDURE — 97110 THERAPEUTIC EXERCISES: CPT | Mod: GP | Performed by: PHYSICAL THERAPY ASSISTANT

## 2018-05-08 PROCEDURE — 97140 MANUAL THERAPY 1/> REGIONS: CPT | Mod: GP | Performed by: PHYSICAL THERAPY ASSISTANT

## 2018-05-08 PROCEDURE — 97016 VASOPNEUMATIC DEVICE THERAPY: CPT | Mod: GP | Performed by: PHYSICAL THERAPY ASSISTANT

## 2018-05-10 ENCOUNTER — THERAPY VISIT (OUTPATIENT)
Dept: PHYSICAL THERAPY | Facility: CLINIC | Age: 43
End: 2018-05-10
Payer: COMMERCIAL

## 2018-05-10 DIAGNOSIS — R60.9 EDEMA: ICD-10-CM

## 2018-05-10 DIAGNOSIS — Z98.890 S/P ACL RECONSTRUCTION: ICD-10-CM

## 2018-05-10 DIAGNOSIS — M25.562 KNEE PAIN, LEFT: ICD-10-CM

## 2018-05-10 PROCEDURE — 97110 THERAPEUTIC EXERCISES: CPT | Mod: GP | Performed by: PHYSICAL THERAPY ASSISTANT

## 2018-05-10 PROCEDURE — 97140 MANUAL THERAPY 1/> REGIONS: CPT | Mod: GP | Performed by: PHYSICAL THERAPY ASSISTANT

## 2018-05-10 PROCEDURE — 97016 VASOPNEUMATIC DEVICE THERAPY: CPT | Mod: GP | Performed by: PHYSICAL THERAPY ASSISTANT

## 2018-05-14 ENCOUNTER — THERAPY VISIT (OUTPATIENT)
Dept: PHYSICAL THERAPY | Facility: CLINIC | Age: 43
End: 2018-05-14
Payer: COMMERCIAL

## 2018-05-14 DIAGNOSIS — R60.9 EDEMA: ICD-10-CM

## 2018-05-14 DIAGNOSIS — Z98.890 S/P ACL RECONSTRUCTION: ICD-10-CM

## 2018-05-14 DIAGNOSIS — M25.562 KNEE PAIN, LEFT: ICD-10-CM

## 2018-05-14 PROCEDURE — 97140 MANUAL THERAPY 1/> REGIONS: CPT | Mod: GP | Performed by: PHYSICAL THERAPIST

## 2018-05-14 PROCEDURE — 97110 THERAPEUTIC EXERCISES: CPT | Mod: GP | Performed by: PHYSICAL THERAPIST

## 2018-05-14 PROCEDURE — 97016 VASOPNEUMATIC DEVICE THERAPY: CPT | Mod: GP | Performed by: PHYSICAL THERAPIST

## 2018-05-17 ENCOUNTER — THERAPY VISIT (OUTPATIENT)
Dept: PHYSICAL THERAPY | Facility: CLINIC | Age: 43
End: 2018-05-17
Payer: COMMERCIAL

## 2018-05-17 DIAGNOSIS — R60.9 EDEMA: ICD-10-CM

## 2018-05-17 DIAGNOSIS — Z98.890 S/P ACL RECONSTRUCTION: ICD-10-CM

## 2018-05-17 DIAGNOSIS — M25.562 KNEE PAIN, LEFT: ICD-10-CM

## 2018-05-17 PROCEDURE — 97110 THERAPEUTIC EXERCISES: CPT | Mod: GP | Performed by: PHYSICAL THERAPY ASSISTANT

## 2018-05-17 PROCEDURE — 97140 MANUAL THERAPY 1/> REGIONS: CPT | Mod: GP | Performed by: PHYSICAL THERAPY ASSISTANT

## 2018-05-17 PROCEDURE — 97016 VASOPNEUMATIC DEVICE THERAPY: CPT | Mod: GP | Performed by: PHYSICAL THERAPY ASSISTANT

## 2018-05-22 ENCOUNTER — THERAPY VISIT (OUTPATIENT)
Dept: PHYSICAL THERAPY | Facility: CLINIC | Age: 43
End: 2018-05-22
Payer: COMMERCIAL

## 2018-05-22 DIAGNOSIS — R60.9 EDEMA: ICD-10-CM

## 2018-05-22 DIAGNOSIS — M25.562 KNEE PAIN, LEFT: ICD-10-CM

## 2018-05-22 DIAGNOSIS — Z98.890 S/P ACL RECONSTRUCTION: ICD-10-CM

## 2018-05-22 PROCEDURE — 97110 THERAPEUTIC EXERCISES: CPT | Mod: GP | Performed by: PHYSICAL THERAPY ASSISTANT

## 2018-05-22 PROCEDURE — 97016 VASOPNEUMATIC DEVICE THERAPY: CPT | Mod: GP | Performed by: PHYSICAL THERAPY ASSISTANT

## 2018-05-22 PROCEDURE — 97140 MANUAL THERAPY 1/> REGIONS: CPT | Mod: GP | Performed by: PHYSICAL THERAPY ASSISTANT

## 2018-05-24 ENCOUNTER — THERAPY VISIT (OUTPATIENT)
Dept: PHYSICAL THERAPY | Facility: CLINIC | Age: 43
End: 2018-05-24
Payer: COMMERCIAL

## 2018-05-24 DIAGNOSIS — R60.9 EDEMA: ICD-10-CM

## 2018-05-24 DIAGNOSIS — M25.562 KNEE PAIN, LEFT: Primary | ICD-10-CM

## 2018-05-24 DIAGNOSIS — Z98.890 S/P ACL RECONSTRUCTION: ICD-10-CM

## 2018-05-24 PROCEDURE — 97140 MANUAL THERAPY 1/> REGIONS: CPT | Mod: GP | Performed by: PHYSICAL THERAPY ASSISTANT

## 2018-05-24 PROCEDURE — 97110 THERAPEUTIC EXERCISES: CPT | Mod: GP | Performed by: PHYSICAL THERAPY ASSISTANT

## 2018-05-24 PROCEDURE — 97016 VASOPNEUMATIC DEVICE THERAPY: CPT | Mod: GP | Performed by: PHYSICAL THERAPY ASSISTANT

## 2018-05-30 ENCOUNTER — THERAPY VISIT (OUTPATIENT)
Dept: PHYSICAL THERAPY | Facility: CLINIC | Age: 43
End: 2018-05-30
Payer: COMMERCIAL

## 2018-05-30 DIAGNOSIS — M25.562 KNEE PAIN, LEFT: ICD-10-CM

## 2018-05-30 DIAGNOSIS — Z98.890 S/P ACL RECONSTRUCTION: ICD-10-CM

## 2018-05-30 DIAGNOSIS — R60.9 EDEMA: ICD-10-CM

## 2018-05-30 PROCEDURE — 97112 NEUROMUSCULAR REEDUCATION: CPT | Mod: GP | Performed by: PHYSICAL THERAPIST

## 2018-05-30 PROCEDURE — 97110 THERAPEUTIC EXERCISES: CPT | Mod: GP | Performed by: PHYSICAL THERAPIST

## 2018-05-30 PROCEDURE — 97140 MANUAL THERAPY 1/> REGIONS: CPT | Mod: GP | Performed by: PHYSICAL THERAPIST

## 2018-06-06 ENCOUNTER — THERAPY VISIT (OUTPATIENT)
Dept: PHYSICAL THERAPY | Facility: CLINIC | Age: 43
End: 2018-06-06
Payer: COMMERCIAL

## 2018-06-06 DIAGNOSIS — M25.562 KNEE PAIN, LEFT: ICD-10-CM

## 2018-06-06 DIAGNOSIS — Z98.890 S/P ACL RECONSTRUCTION: ICD-10-CM

## 2018-06-06 DIAGNOSIS — R60.9 EDEMA: ICD-10-CM

## 2018-06-06 PROCEDURE — 97110 THERAPEUTIC EXERCISES: CPT | Mod: GP | Performed by: PHYSICAL THERAPIST

## 2018-06-06 PROCEDURE — 97530 THERAPEUTIC ACTIVITIES: CPT | Mod: GP | Performed by: PHYSICAL THERAPIST

## 2018-06-06 PROCEDURE — 97112 NEUROMUSCULAR REEDUCATION: CPT | Mod: GP | Performed by: PHYSICAL THERAPIST

## 2018-06-13 ENCOUNTER — THERAPY VISIT (OUTPATIENT)
Dept: PHYSICAL THERAPY | Facility: CLINIC | Age: 43
End: 2018-06-13
Payer: COMMERCIAL

## 2018-06-13 DIAGNOSIS — M25.562 KNEE PAIN, LEFT: ICD-10-CM

## 2018-06-13 DIAGNOSIS — Z98.890 S/P ACL RECONSTRUCTION: ICD-10-CM

## 2018-06-13 DIAGNOSIS — R60.9 EDEMA: ICD-10-CM

## 2018-06-13 PROCEDURE — 97112 NEUROMUSCULAR REEDUCATION: CPT | Mod: GP | Performed by: PHYSICAL THERAPIST

## 2018-06-13 PROCEDURE — 97110 THERAPEUTIC EXERCISES: CPT | Mod: GP | Performed by: PHYSICAL THERAPIST

## 2018-06-18 ENCOUNTER — OFFICE VISIT (OUTPATIENT)
Dept: ORTHOPEDICS | Facility: CLINIC | Age: 43
End: 2018-06-18
Payer: COMMERCIAL

## 2018-06-18 DIAGNOSIS — Z98.890 STATUS POST SURGERY: Primary | ICD-10-CM

## 2018-06-18 NOTE — LETTER
6/18/2018       RE: Dianelys Wells  [address]  [facility] [address]     Dear Colleague,    Thank you for referring your patient, Dianelys Wells, to the [facility] at [facility]. Please see a copy of my visit note below.    Orthopedic follow-up clinic  Date of surgery: 4/27/2018 left knee ACL reconstruction with hamstring autograft, all inside repair medial meniscus left knee    Interval history patient returns today 6 weeks status post the above-mentioned procedure.  Patient reports he is doing quite well she was returned to crossfit and biked to work this past week.  She has no pain is off all pain medications.  She is happy with the progress of her range of motion rehabilitation thus far and has no other complaints or concerns at this juncture.    Physical examination  Patient is a pleasant alert freely conversant in no acute distress  Examination of the patient's left lower extremity notable for range of motion 0-120   resolved ecchymoses incisions are healing well. sensation intact to light touch saphenous sural tibial deep and superficial peroneal nerve distributions dorsalis pedis pulses palpable and regular.    Imaging  2 views left knee AP and lateral notable for postoperative changes reflecting ACL reconstruction with appropriate tunnel position Endobutton fixation.      Assessment  42-year-old female status post left knee ACL reconstruction with hamstring autograft and all inside medial meniscus repair 4/27/2018 doing well postoperatively.      Weightbearing: WBAT    Weightbearing: WBAT    Range of Motion: No range of motion restrictions    Pain Medications: We reviewed post-operative pain medications at today's visit. The patient has stopped all opioid pain medications and no further refills are required    Extension: We reviewed the importance of full knee extension and demonstrated the relevant exercises as appropriate    Crutches/Brace:  Patient no longer requires the hinged knee brace or crutches.     Acitivity Restrictions:    Discussed that this is the dangerous time after ACL reconstruction    Reviewed activity restrictions at today's visit    Goal to progress strength and motion to allow straight line running at three months from the date of surgery    Follow up: 6 weeks with no new radiographs needed        Answers for HPI/ROS submitted by the patient on 6/13/2018   General Symptoms: No  Skin Symptoms: No  HENT Symptoms: No  EYE SYMPTOMS: No  HEART SYMPTOMS: No  LUNG SYMPTOMS: No  INTESTINAL SYMPTOMS: No  URINARY SYMPTOMS: No  GYNECOLOGIC SYMPTOMS: No  BREAST SYMPTOMS: No  SKELETAL SYMPTOMS: No  BLOOD SYMPTOMS: No  NERVOUS SYSTEM SYMPTOMS: No  MENTAL HEALTH SYMPTOMS: No      Patient seen and examined with the resident.     Assesment: 6 weeks following acl reconstruction hs autograft      Plan: Weightbearing: WBAT    Range of Motion: No range of motion restrictions    Pain Medications: We reviewed post-operative pain medications at today's visit. The patient has stopped all opioid pain medications and no further refills are required    Extension: We reviewed the importance of full knee extension and demonstrated the relevant exercises as appropriate    Crutches/Brace: Patient no longer requires the hinged knee brace or crutches.     Acitivity Restrictions:    Discussed that this is the dangerous time after ACL reconstruction    Reviewed activity restrictions at today's visit    Goal to progress strength and motion to allow straight line running at three months from the date of surgery    Follow up: 6 weeks with no new radiographs needed     I agree with history, physical and imaging as well as the assessment and plan as detailed by Dr. Rosas.       Again, thank you for allowing me to participate in the care of your patient.      Sincerely,    Jaun Biggs MD

## 2018-06-18 NOTE — MR AVS SNAPSHOT
After Visit Summary   6/18/2018    Dianelys Wells    MRN: [id number]           Patient Information     Date Of Birth          1975        Visit Information        Provider Department      6/18/2018 8:50 AM Jaun Biggs MD [facility]        Today's Diagnoses     Status post surgery    -  1       Follow-ups after your visit        Your next 10 appointments already scheduled     Jun 21, 2018  3:30 PM CDT   [facility]ity [facility]IE [facility] ([facility])    [address] [place] [address] [phone]            Jun 27, 2018  4:40 PM CDT   JENN Extremity with ALBERTO Tyson [facility] Physical Therapy JENN ([facility])    [address] [place] [address] [phone]            Jul 02, 2018  3:50 PM CDT   JENN Extremity with Gigi See PT   [facility] Physical [facility] JENN ([facility])    [address] [place] [address] [phone]            Jul 11, 2018  3:20 PM CDT   JENN Extremity with Gigi See PT   [facility] JENN ([facility])    [address] [place] [address] [phone]              Who to contact     Please call your clinic at [phone] to:    Ask questions about your health    Make or cancel appointments    Discuss your medicines    Learn about your test results    Speak to your doctor            Additional Information About Your Visit        MyChart Information     [employer] gives you secure access to your electronic health record. If you see a primary care provider, you can also send messages to your care team and make appointments. If you have questions, please call your primary care clinic.  If you do not have a primary care provider, please call  [phone] and they will assist you.      [employer] is an electronic gateway that provides easy, online access to your medical records. With [employer], you can request a clinic appointment, read your test results, renew a prescription or communicate with your care team.     To access your existing account, please contact your [facility] or call [phone] for assistance.        Care EveryWhere ID     This is your Care EveryWhere ID. This could be used by other organizations to access your [place] medical records  [id number]         Blood Pressure from Last 3 Encounters:   04/27/18 113/71   04/20/18 121/81   03/05/18 129/85    Weight from Last 3 Encounters:   04/20/18 85 kg (187 lb 6.4 oz)   04/16/18 85.3 kg (188 lb)   03/05/18 85.5 kg (188 lb 8 oz)              Today, you had the following     No orders found for display       Primary Care Provider Office Phone # Fax #    Lazara GODINEZ Starks [phone]       [facility] [address]  [facility] [address]        Equal Access to Services     DAVID [facility] : Hadii graciela ku hadasho Sodavidali, waaxda luqadaha, qaybta kaalmada adequiqueyamelyssa, nikhil cruz . So [facility] [phone].    ATENCIÓN: Si habla español, tiene a granger disposición servicios gratuitos de asistencia lingüística. Llame al [phone].    We comply with applicable federal civil rights laws and [place] laws. We do not discriminate on the basis of race, color, national origin, age, disability, sex, sexual orientation, or gender identity.            Thank you!     Thank you for choosing [facility]  for your care. Our goal is always to provide you with excellent care. Hearing back from our patients is one way we can continue to improve our services. Please take a few minutes to complete the written survey that you may receive in the mail after your visit with us. Thank you!             Your Updated Medication List -  Protect others around you: Learn how to safely use, store and throw away your medicines at www.disposemymeds.org.          This list is accurate as of 6/18/18 10:53 AM.  Always use your most recent med list.                   Brand Name Dispense Instructions for use Diagnosis    acetaminophen 325 MG tablet    TYLENOL    100 tablet    Take 2 tablets (650 mg) by mouth every 4 hours    Status post surgery       ALEVE PO      Take 220 mg by mouth        aspirin 325 MG EC tablet     28 tablet    Take 1 tablet (325 mg) by mouth daily To be taken for DVT Prophylaxis daily    Status post surgery       DEPO-PROVERA 150 MG/ML injection   Generic drug:  medroxyPROGESTERone      Inject 150 mg into the muscle every 3 months        hydrOXYzine 25 MG tablet    ATARAX    30 tablet    Take 1 tablet (25 mg) by mouth every 6 hours as needed for itching (and nausea)    Status post surgery       ondansetron 4 MG ODT tab    ZOFRAN-ODT    4 tablet    Take 1-2 tablets (4-8 mg) by mouth every 8 hours as needed for nausea Dissolve ON the tongue.    Status post surgery       oxyCODONE IR 5 MG tablet    ROXICODONE    20 tablet    Take 1-2 tablets (5-10 mg) by mouth every 6 hours as needed for severe pain maximum 6 tablet(s) per day    Status post surgery       senna-docusate 8.6-50 MG per tablet    SENOKOT-S;PERICOLACE    24 tablet    Take 1-2 tablets by mouth 2 times daily Take while on oral narcotics to prevent or treat constipation.    Status post surgery       WELLBUTRIN PO      Take 150 mg by mouth every morning

## 2018-06-18 NOTE — NURSING NOTE
Reason For Visit:   Chief Complaint   Patient presents with     Surgical Followup     DOS 4/27/18 Examination Under Anesthesia Left Knee, Left Anterior Cruciate Ligament Reconstruction, Hamstring Autograft, Meniscus Repair        Date of surgery: 4/27/18  Type of surgery:   PROCEDURE:  1. Examination under anesthesia left knee  2. Left knee arthroscopy  3. Left knee ACL reconstruction hamstring autograft  4. All inside repair of the medial meniscus left knee    Smoker: No  Request smoking cessation information: No    Pain Assessment  Patient Currently in Pain: No

## 2018-06-18 NOTE — PROGRESS NOTES
Patient seen and examined with the resident.     Assesment: 6 weeks following acl reconstruction hs autograft      Plan: Weightbearing: WBAT    Range of Motion: No range of motion restrictions    Pain Medications: We reviewed post-operative pain medications at today's visit. The patient has stopped all opioid pain medications and no further refills are required    Extension: We reviewed the importance of full knee extension and demonstrated the relevant exercises as appropriate    Crutches/Brace: Patient no longer requires the hinged knee brace or crutches.     Acitivity Restrictions:    Discussed that this is the dangerous time after ACL reconstruction    Reviewed activity restrictions at today's visit    Goal to progress strength and motion to allow straight line running at three months from the date of surgery    Follow up: 6 weeks with no new radiographs needed     I agree with history, physical and imaging as well as the assessment and plan as detailed by Dr. Rosas.

## 2018-06-18 NOTE — PROGRESS NOTES
Orthopedic follow-up clinic  Date of surgery: 4/27/2018 left knee ACL reconstruction with hamstring autograft, all inside repair medial meniscus left knee    Interval history patient returns today 6 weeks status post the above-mentioned procedure.  Patient reports he is doing quite well she was returned to crossfit and biked to work this past week.  She has no pain is off all pain medications.  She is happy with the progress of her range of motion rehabilitation thus far and has no other complaints or concerns at this juncture.    Physical examination  Patient is a pleasant alert freely conversant in no acute distress  Examination of the patient's left lower extremity notable for range of motion 0-120  resolved ecchymoses incisions are healing well. sensation intact to light touch saphenous sural tibial deep and superficial peroneal nerve distributions dorsalis pedis pulses palpable and regular.    Imaging  2 views left knee AP and lateral notable for postoperative changes reflecting ACL reconstruction with appropriate tunnel position Endobutton fixation.      Assessment  42-year-old female status post left knee ACL reconstruction with hamstring autograft and all inside medial meniscus repair 4/27/2018 doing well postoperatively.      Weightbearing: WBAT    Weightbearing: WBAT    Range of Motion: No range of motion restrictions    Pain Medications: We reviewed post-operative pain medications at today's visit. The patient has stopped all opioid pain medications and no further refills are required    Extension: We reviewed the importance of full knee extension and demonstrated the relevant exercises as appropriate    Crutches/Brace: Patient no longer requires the hinged knee brace or crutches.     Acitivity Restrictions:    Discussed that this is the dangerous time after ACL reconstruction    Reviewed activity restrictions at today's visit    Goal to progress strength and motion to allow straight line running at three  months from the date of surgery    Follow up: 6 weeks with no new radiographs needed        Answers for HPI/ROS submitted by the patient on 6/13/2018   General Symptoms: No  Skin Symptoms: No  HENT Symptoms: No  EYE SYMPTOMS: No  HEART SYMPTOMS: No  LUNG SYMPTOMS: No  INTESTINAL SYMPTOMS: No  URINARY SYMPTOMS: No  GYNECOLOGIC SYMPTOMS: No  BREAST SYMPTOMS: No  SKELETAL SYMPTOMS: No  BLOOD SYMPTOMS: No  NERVOUS SYSTEM SYMPTOMS: No  MENTAL HEALTH SYMPTOMS: No

## 2018-06-21 ENCOUNTER — THERAPY VISIT (OUTPATIENT)
Dept: PHYSICAL THERAPY | Facility: CLINIC | Age: 43
End: 2018-06-21
Payer: COMMERCIAL

## 2018-06-21 DIAGNOSIS — M25.562 KNEE PAIN, LEFT: ICD-10-CM

## 2018-06-21 DIAGNOSIS — R60.9 EDEMA: ICD-10-CM

## 2018-06-21 DIAGNOSIS — Z98.890 S/P ACL RECONSTRUCTION: ICD-10-CM

## 2018-06-21 PROCEDURE — 97112 NEUROMUSCULAR REEDUCATION: CPT | Mod: GP | Performed by: PHYSICAL THERAPY ASSISTANT

## 2018-06-21 PROCEDURE — 97110 THERAPEUTIC EXERCISES: CPT | Mod: GP | Performed by: PHYSICAL THERAPY ASSISTANT

## 2018-06-27 ENCOUNTER — THERAPY VISIT (OUTPATIENT)
Dept: PHYSICAL THERAPY | Facility: CLINIC | Age: 43
End: 2018-06-27
Payer: COMMERCIAL

## 2018-06-27 DIAGNOSIS — Z98.890 S/P ACL RECONSTRUCTION: ICD-10-CM

## 2018-06-27 DIAGNOSIS — R60.9 EDEMA: ICD-10-CM

## 2018-06-27 DIAGNOSIS — M25.562 KNEE PAIN, LEFT: ICD-10-CM

## 2018-06-27 PROCEDURE — 97530 THERAPEUTIC ACTIVITIES: CPT | Mod: GP | Performed by: PHYSICAL THERAPIST

## 2018-06-27 PROCEDURE — 97112 NEUROMUSCULAR REEDUCATION: CPT | Mod: GP | Performed by: PHYSICAL THERAPIST

## 2018-06-27 NOTE — MR AVS SNAPSHOT
After Visit Summary   6/27/2018    Dianelys Wells    MRN: [id number]           Patient Information     Date Of Birth          1975        Visit Information        Provider Department      6/27/2018 4:40 PM Gigi See PT [facility]        Today's Diagnoses     Knee pain, left        Edema        S/P ACL reconstruction           Follow-ups after your visit        Your next 10 appointments already scheduled     Jul 02, 2018  3:50 PM CDT   JENN Extremity with ALBERTO [facility] ([facility])    [address] [place] [id number] [phone]            Jul 11, 2018  3:20 PM CDT   JENN Extremity with [facility]O [facility] ([facility])    [address] [place] [id number] [phone]            Jul 18, 2018  4:40 PM CDT   JENN Extremity with ALBERTO [facility] ([facility])    [address] [place] [id number] [phone]            Jul 25, 2018  4:40 PM CDT   JENN Extremity with ALBERTO [facility] ([facility])    [address] [place] [id number] [phone]            Jul 30, 2018  4:30 PM CDT   JENN Extremity with ALBERTO [facility] ([facility])    [address] [place] [id number] [phone]              Who to contact     If you have questions or need follow up information about today's clinic visit or your schedule please contact [facility] directly at [phone].  Normal or non-critical lab and imaging results will be communicated to you by MyChart, letter or phone within 4 business days after the clinic has  received the results. If you do not hear from us within 7 days, please contact the clinic through [email or web address] or phone. If you have a critical or abnormal lab result, we will notify you by phone as soon as possible.  Submit refill requests through [email or web address] or call your pharmacy and they will forward the refill request to us. Please allow 3 business days for your refill to be completed.          Additional Information About Your Visit        [employer] Information     [email or web address] gives you secure access to your electronic health record. If you see a primary care provider, you can also send messages to your care team and make appointments. If you have questions, please call your primary care clinic.  If you do not have a primary care provider, please call [phone] and they will assist you.        Care EveryWhere ID     This is your Care EveryWhere ID. This could be used by other organizations to access your [place] medical records  [id number]         Blood Pressure from Last 3 Encounters:   04/27/18 113/71   04/20/18 121/81   03/05/18 129/85    Weight from Last 3 Encounters:   04/20/18 85 kg (187 lb 6.4 oz)   04/16/18 85.3 kg (188 lb)   03/05/18 85.5 kg (188 lb 8 oz)              We Performed the Following     NEUROMUSCULAR RE-EDUCATION     THERAPEUTIC ACTIVITIES        Primary Care Provider Office Phone # Fax #    Lazara GODINEZ Raudel [phone]       [facility] [address]  [facility] [address]        Equal Access to Services     BAO NOE : Hadii aad ku hadasho Soomaali, waaxda luqadaha, qaybta kaalmada adeegyada, nikhil crouch. So [facility] [phone].    ATENCIÓN: Si habla español, tiene a granger disposición servicios gratuitos de asistencia lingüística. Llame al [phone].    We comply with applicable federal civil rights laws and [place] laws. We do not discriminate on the basis of race, color, national origin, age, disability, sex, sexual orientation, or gender  identity.            Thank you!     Thank you for choosing [facility] PHYSICAL THERAPY [facility]  for your care. Our goal is always to provide you with excellent care. Hearing back from our patients is one way we can continue to improve our services. Please take a few minutes to complete the written survey that you may receive in the mail after your visit with us. Thank you!             Your Updated Medication List - Protect others around you: Learn how to safely use, store and throw away your medicines at www.disposemymeds.org.          This list is accurate as of 6/27/18 11:59 PM.  Always use your most recent med list.                   Brand Name Dispense Instructions for use Diagnosis    acetaminophen 325 MG tablet    TYLENOL    100 tablet    Take 2 tablets (650 mg) by mouth every 4 hours    Status post surgery       ALEVE PO      Take 220 mg by mouth        aspirin 325 MG EC tablet     28 tablet    Take 1 tablet (325 mg) by mouth daily To be taken for DVT Prophylaxis daily    Status post surgery       DEPO-PROVERA 150 MG/ML injection   Generic drug:  medroxyPROGESTERone      Inject 150 mg into the muscle every 3 months        hydrOXYzine 25 MG tablet    ATARAX    30 tablet    Take 1 tablet (25 mg) by mouth every 6 hours as needed for itching (and nausea)    Status post surgery       ondansetron 4 MG ODT tab    ZOFRAN-ODT    4 tablet    Take 1-2 tablets (4-8 mg) by mouth every 8 hours as needed for nausea Dissolve ON the tongue.    Status post surgery       oxyCODONE IR 5 MG tablet    ROXICODONE    20 tablet    Take 1-2 tablets (5-10 mg) by mouth every 6 hours as needed for severe pain maximum 6 tablet(s) per day    Status post surgery       senna-docusate 8.6-50 MG per tablet    SENOKOT-S;PERICOLACE    24 tablet    Take 1-2 tablets by mouth 2 times daily Take while on oral narcotics to prevent or treat constipation.    Status post surgery       WELLBUTRIN PO      Take 150 mg by mouth every morning

## 2018-06-28 NOTE — PROGRESS NOTES
"  Assessment/Plan:    PROGRESS  REPORT    Progress reporting period is from 5/2/18 to 6/27/18.       SUBJECTIVE  Subjective changes noted by patient.  Subjective: Pt reports she is doing well. Continues to improve strength and motion. States that she continues to be active at crossfit and has been adding small weights to her exercises. She was advised to be very cautious with her activities at this point in her recovery to preserve integrity of graft.      Current Pain level: 0/10.     Initial Pain level: 4/10.   Changes in function:  Yes (See Goal flowsheet attached for changes in current functional level)  Adverse reaction to treatment or activity: None    OBJECTIVE  Changes noted in objective findings:    Objective:     ROM: 3-0-135.   Anterior reach: 4\" deficit compared to R.   SL squat: 20* deficit compared to R.   Single leg stance 60\" +   No effusion noted    ASSESSMENT/PLAN  Updated problem list and treatment plan: Diagnosis 1:  S/p L ACLR  Pain -  hot/cold therapy, manual therapy, STS, splint/taping/bracing/orthotics, self management and home program  Decreased ROM/flexibility - manual therapy, therapeutic exercise, therapeutic activity and home program  Decreased strength - therapeutic exercise, therapeutic activities and home program  Impaired balance - neuro re-education, therapeutic activities and home program  Decreased proprioception - neuro re-education, therapeutic activities and home program  Edema - vasopneumatics, cold therapy and cryocuff  Impaired muscle performance - neuro re-education and home program  Decreased function - therapeutic activities, home program and functional performance testing  STG/LTGs have been met or progress has been made towards goals:  Yes (See Goal flow sheet completed today.)  Assessment of Progress: The patient's condition is improving.  Self Management Plans:  Patient has been instructed in a home treatment program.  I have re-evaluated this patient and find that " the nature, scope, duration and intensity of the therapy is appropriate for the medical condition of the patient.  Dianelys continues to require the following intervention to meet STG and LTG's:  PT    Recommendations:  This patient would benefit from continued therapy.     Frequency:  1 X week, once daily  Duration:  for 4 weeks tapering to 2 X a month over 12 weeks        Please refer to the daily flowsheet for treatment today, total treatment time and time spent performing 1:1 timed codes.

## 2018-07-02 ENCOUNTER — THERAPY VISIT (OUTPATIENT)
Dept: PHYSICAL THERAPY | Facility: CLINIC | Age: 43
End: 2018-07-02
Payer: COMMERCIAL

## 2018-07-02 DIAGNOSIS — R60.9 EDEMA: ICD-10-CM

## 2018-07-02 DIAGNOSIS — M25.562 KNEE PAIN, LEFT: ICD-10-CM

## 2018-07-02 DIAGNOSIS — Z98.890 S/P ACL RECONSTRUCTION: ICD-10-CM

## 2018-07-02 PROCEDURE — 97112 NEUROMUSCULAR REEDUCATION: CPT | Mod: GP | Performed by: PHYSICAL THERAPIST

## 2018-07-02 PROCEDURE — 97110 THERAPEUTIC EXERCISES: CPT | Mod: GP | Performed by: PHYSICAL THERAPIST

## 2018-07-11 ENCOUNTER — THERAPY VISIT (OUTPATIENT)
Dept: PHYSICAL THERAPY | Facility: CLINIC | Age: 43
End: 2018-07-11
Payer: COMMERCIAL

## 2018-07-11 DIAGNOSIS — R60.0 LOCALIZED EDEMA: ICD-10-CM

## 2018-07-11 DIAGNOSIS — M25.562 LEFT KNEE PAIN, UNSPECIFIED CHRONICITY: ICD-10-CM

## 2018-07-11 DIAGNOSIS — Z98.890 S/P ACL RECONSTRUCTION: ICD-10-CM

## 2018-07-11 PROCEDURE — 97140 MANUAL THERAPY 1/> REGIONS: CPT | Mod: GP | Performed by: PHYSICAL THERAPIST

## 2018-07-11 PROCEDURE — 97110 THERAPEUTIC EXERCISES: CPT | Mod: GP | Performed by: PHYSICAL THERAPIST

## 2018-07-11 PROCEDURE — 97112 NEUROMUSCULAR REEDUCATION: CPT | Mod: GP | Performed by: PHYSICAL THERAPIST

## 2018-07-18 ENCOUNTER — THERAPY VISIT (OUTPATIENT)
Dept: PHYSICAL THERAPY | Facility: CLINIC | Age: 43
End: 2018-07-18
Payer: COMMERCIAL

## 2018-07-18 DIAGNOSIS — R60.0 LOCALIZED EDEMA: ICD-10-CM

## 2018-07-18 DIAGNOSIS — M25.562 LEFT KNEE PAIN, UNSPECIFIED CHRONICITY: ICD-10-CM

## 2018-07-18 DIAGNOSIS — Z98.890 S/P ACL RECONSTRUCTION: ICD-10-CM

## 2018-07-18 PROCEDURE — 97110 THERAPEUTIC EXERCISES: CPT | Mod: GP | Performed by: PHYSICAL THERAPIST

## 2018-07-18 PROCEDURE — 97112 NEUROMUSCULAR REEDUCATION: CPT | Mod: GP | Performed by: PHYSICAL THERAPIST

## 2018-07-18 PROCEDURE — 97530 THERAPEUTIC ACTIVITIES: CPT | Mod: GP | Performed by: PHYSICAL THERAPIST

## 2018-07-30 ENCOUNTER — THERAPY VISIT (OUTPATIENT)
Dept: PHYSICAL THERAPY | Facility: CLINIC | Age: 43
End: 2018-07-30
Payer: COMMERCIAL

## 2018-07-30 DIAGNOSIS — M25.562 LEFT KNEE PAIN, UNSPECIFIED CHRONICITY: ICD-10-CM

## 2018-07-30 DIAGNOSIS — R60.0 LOCALIZED EDEMA: ICD-10-CM

## 2018-07-30 DIAGNOSIS — Z98.890 S/P ACL RECONSTRUCTION: ICD-10-CM

## 2018-07-30 PROCEDURE — 97530 THERAPEUTIC ACTIVITIES: CPT | Mod: GP | Performed by: PHYSICAL THERAPIST

## 2018-07-30 PROCEDURE — 97112 NEUROMUSCULAR REEDUCATION: CPT | Mod: GP | Performed by: PHYSICAL THERAPIST

## 2018-07-30 ASSESSMENT — ACTIVITIES OF DAILY LIVING (ADL)
KNEEL ON THE FRONT OF YOUR KNEE: ACTIVITY IS SOMEWHAT DIFFICULT
GIVING WAY, BUCKLING OR SHIFTING OF KNEE: I DO NOT HAVE THE SYMPTOM
KNEE_ACTIVITY_OF_DAILY_LIVING_SCORE: 94.29
SIT WITH YOUR KNEE BENT: ACTIVITY IS NOT DIFFICULT
HOW_WOULD_YOU_RATE_THE_OVERALL_FUNCTION_OF_YOUR_KNEE_DURING_YOUR_USUAL_DAILY_ACTIVITIES?: NORMAL
RISE FROM A CHAIR: ACTIVITY IS NOT DIFFICULT
WALK: ACTIVITY IS NOT DIFFICULT
LIMPING: I DO NOT HAVE THE SYMPTOM
WEAKNESS: I HAVE THE SYMPTOM BUT IT DOES NOT AFFECT MY ACTIVITY
STIFFNESS: I HAVE THE SYMPTOM BUT IT DOES NOT AFFECT MY ACTIVITY
SQUAT: ACTIVITY IS NOT DIFFICULT
GO UP STAIRS: ACTIVITY IS NOT DIFFICULT
PAIN: I DO NOT HAVE THE SYMPTOM
KNEE_ACTIVITY_OF_DAILY_LIVING_SUM: 66
AS_A_RESULT_OF_YOUR_KNEE_INJURY,_HOW_WOULD_YOU_RATE_YOUR_CURRENT_LEVEL_OF_DAILY_ACTIVITY?: NEARLY NORMAL
HOW_WOULD_YOU_RATE_THE_CURRENT_FUNCTION_OF_YOUR_KNEE_DURING_YOUR_USUAL_DAILY_ACTIVITIES_ON_A_SCALE_FROM_0_TO_100_WITH_100_BEING_YOUR_LEVEL_OF_KNEE_FUNCTION_PRIOR_TO_YOUR_INJURY_AND_0_BEING_THE_INABILITY_TO_PERFORM_ANY_OF_YOUR_USUAL_DAILY_ACTIVITIES?: 100
GO DOWN STAIRS: ACTIVITY IS NOT DIFFICULT
RAW_SCORE: 66
STAND: ACTIVITY IS NOT DIFFICULT
SWELLING: I DO NOT HAVE THE SYMPTOM

## 2018-07-30 NOTE — MR AVS SNAPSHOT
After Visit Summary   7/30/2018    Dianelys Wells    MRN: [id number]           Patient Information     Date Of Birth          1975        Visit Information        Provider Department      7/30/2018 4:30 PM Gigi See PT [facility]        Today's Diagnoses     Left knee pain, unspecified chronicity        Localized edema        S/P ACL reconstruction           Follow-ups after your visit        Your next 10 appointments already scheduled     Aug 01, 2018 10:20 AM CDT   (Arrive by 10:05 AM)   RETURN KNEE with Jaun Biggs MD   [facility] ([facility])    [address] [place] [id number] [phone]            Aug 13, 2018  1:50 PM CDT   JENN Extremity with Timothy Gutierres PT   [facility] ([facility])    [address] [place] [address] [phone]            Aug 27, 2018  1:50 PM CDT   JENN Extremity with Timothy Gutierres PT   [facility] ([facility])    [address] [place] [address] [phone]              Who to contact     If you have questions or need follow up information about today's clinic visit or your schedule please contact [facility] directly at [phone].  Normal or non-critical lab and imaging results will be communicated to you by MyChart, letter or phone within 4 business days after the clinic has received the results. If you do not hear from us within 7 days, please contact the clinic through MyChart or phone. If you have a critical or abnormal lab result, we will notify you by phone as soon as possible.  Submit refill requests through [employer] or call your pharmacy and they will forward the refill request to us. Please allow 3 business days for your refill to be completed.          Additional  Information About Your Visit        [employer] Information     [employer] gives you secure access to your electronic health record. If you see a primary care provider, you can also send messages to your care team and make appointments. If you have questions, please call your primary care clinic.  If you do not have a primary care provider, please call [phone] and they will assist you.        Care EveryWhere ID     This is your Care EveryWhere ID. This could be used by other organizations to access your [place] medical records  [id number]         Blood Pressure from Last 3 Encounters:   04/27/18 113/71   04/20/18 121/81   03/05/18 129/85    Weight from Last 3 Encounters:   04/20/18 85 kg (187 lb 6.4 oz)   04/16/18 85.3 kg (188 lb)   03/05/18 85.5 kg (188 lb 8 oz)              We Performed the Following     NEUROMUSCULAR RE-EDUCATION     THERAPEUTIC ACTIVITIES        Primary Care Provider Office Phone # Fax #    Lazara Starks [phone]       [facility] [address]  [place] [address]        Equal Access to Services     [facility]: Hadii aad ku hadasho Sodavidali, waaxda luqadaha, qaybta kaalmada harriet, nikhil cruz . So [facility] [phone].    ATENCIÓN: Si habla español, tiene a granger disposición servicios gratDooda Inc.os de asistencia lingüística. [facility] [phone].    We comply with applicable federal civil rights laws and [place] laws. We do not discriminate on the basis of race, color, national origin, age, disability, sex, sexual orientation, or gender identity.            Thank you!     Thank you for choosing [facility] PHYSICAL THERAP[facility]  for your care. Our goal is always to provide you with excellent care. Hearing back from our patients is one way we can continue to improve our services. Please take a few minutes to complete the written survey that you may receive in the mail after your visit with us. Thank you!             Your Updated  Medication List - Protect others around you: Learn how to safely use, store and throw away your medicines at www.disposemymeds.org.          This list is accurate as of 7/30/18 11:59 PM.  Always use your most recent med list.                   Brand Name Dispense Instructions for use Diagnosis    acetaminophen 325 MG tablet    TYLENOL    100 tablet    Take 2 tablets (650 mg) by mouth every 4 hours    Status post surgery       ALEVE PO      Take 220 mg by mouth        aspirin 325 MG EC tablet     28 tablet    Take 1 tablet (325 mg) by mouth daily To be taken for DVT Prophylaxis daily    Status post surgery       DEPO-PROVERA 150 MG/ML injection   Generic drug:  medroxyPROGESTERone      Inject 150 mg into the muscle every 3 months        hydrOXYzine 25 MG tablet    ATARAX    30 tablet    Take 1 tablet (25 mg) by mouth every 6 hours as needed for itching (and nausea)    Status post surgery       ondansetron 4 MG ODT tab    ZOFRAN-ODT    4 tablet    Take 1-2 tablets (4-8 mg) by mouth every 8 hours as needed for nausea Dissolve ON the tongue.    Status post surgery       oxyCODONE IR 5 MG tablet    ROXICODONE    20 tablet    Take 1-2 tablets (5-10 mg) by mouth every 6 hours as needed for severe pain maximum 6 tablet(s) per day    Status post surgery       senna-docusate 8.6-50 MG per tablet    SENOKOT-S;PERICOLACE    24 tablet    Take 1-2 tablets by mouth 2 times daily Take while on oral narcotics to prevent or treat constipation.    Status post surgery       WELLBUTRIN PO      Take 150 mg by mouth every morning

## 2018-07-30 NOTE — PROGRESS NOTES
"Lower Extremity Physical Performance Testing    Surgery/Injury: L ACLR    Involved Extremity: left   Date of Surgery/Injury: 4/27/18  Surgeon/MD: Dr. Biggs  Therapist performing test: Gigi See DPT   Primary Treating Therapist: Gigi See DPT    Patient subjective symptom/function report: No complaints - mild \"tightness\" and some pain in patellar tendon with some activities     LSI% =Limb Symmetry Index (score comparison between involved/uninvolved extremity)    Anthropomorphic Measures      Range of Motion   Uninvolved 3-0-130 degrees   Involved 3-0-130 degrees   Difference      Evaluation chosen: Return to Function (Level I): Balance and Muscle Strength. Allowed at or after 2-4 months post-op ACL     Return to Function (Level I): Balance, Muscle Strength   Testing Protocol: Recorded values = best effort of 3 attempts (after 2 practice attempts).    Single Leg Stand and Reach Anterior/Medial Anterior/Lateral   Uninvolved Extremity 30 in. 28 in.   Involved Extremity 31 in.   25 in.   LSI% 103% 89 %     Single Leg Balance Max = 60 seconds   Uninvolved Extremity 60 seconds   Involved Extremity 60 seconds   LSI% 100%     Single Leg Squat    Uninvolved Extremity 90 degrees   Involved Extremity 70 degrees   LSI% 78%     Retro Step    Uninvolved Extremity 14 in.   Involved Extremity 12 in.     LSI% 85%       Quad Strength (Dynamometry - #s)  Uninvolved: 71  Involved: 65  LSI% : 92%      Assessment/Plan:  Progress to more advanced strengthening and balance activities - consider return to jog program with approval from surgeon.    Exercises Instructed per Test Findings:  1) Step downs  2) SL squats - weighted  3) Slider lunges - weighted      "

## 2018-08-01 ENCOUNTER — OFFICE VISIT (OUTPATIENT)
Dept: ORTHOPEDICS | Facility: CLINIC | Age: 43
End: 2018-08-01
Payer: COMMERCIAL

## 2018-08-01 DIAGNOSIS — Z98.890 S/P ACL RECONSTRUCTION: Primary | ICD-10-CM

## 2018-08-01 NOTE — PROGRESS NOTES
Patient seen and examined with the resident.     Assesment: Left knee acl reconstruction, LM repair      Plan: Weightbearing: WBAT    Range of Motion: No range of motion restrictions.     Acitivity Restrictions:    May do straight line running at this time    No running distance or pace restrictions    No cutting, pivoting, or start-stop running    Goal to build strength, endurance, and confidence to allow sports in 3 months time (6 months from the date of surgery)    Brace: Discussed knee bracing options for sports including neoprene knee sleeve ACL functional bracing.     Discussed post-ACL reconstruction therapy program    Follow up: 3 months no XRays with an ACL functional test as completed by physical therapy.        I agree with history, physical and imaging as well as the assessment and plan as detailed by Dr. Ordoñez.

## 2018-08-01 NOTE — PROGRESS NOTES
Orthopedic follow-up clinic  Date of surgery: 4/27/2018 left knee ACL reconstruction with hamstring autograft, all inside repair medial meniscus left knee      HISTORY:  Returns 3 months s/p above. Doing very well. No pain. Doing cross fit.     EXAM:     General: Awake, Alert, and oriented. Articulates and communicates with a normal affect     Left Lower Extremity:    Incisions well healed without evidence of infection    Lachman 0. Negative. Pivot Shift negative. No slide. Stable to varus, valgus.    Range of motion 0-1 30.     Neurovascularly intact    IMAGING:  None    ASSESSMENT:  1. 3 months status post left knee anterior cruciate ligament and inside-out repair pair medial meniscus. She is doing very well.     PLAN:   She can progress to in-line running at this time. We discussed precautions in terms of avoiding dangerous activities and side to side or pivoting type activities until least 6 months.       Weightbearing: WBAT    Range of Motion: No range of motion restrictions.     Acitivity Restrictions:    May do straight line running at this time    No running distance or pace restrictions    No cutting, pivoting, or start-stop running    Goal to build strength, endurance, and confidence to allow sports in 3 months time (6 months from the date of surgery)    Brace: Discussed knee bracing options for sports including neoprene knee sleeve ACL functional bracing.     Discussed post-ACL reconstruction therapy program    Follow up: 3 months no XRays with an ACL functional test as completed by physical therapy.     Seen with Dr. Jaun Biggs.

## 2018-08-01 NOTE — LETTER
8/1/2018    RE: Dianelys Wells  [address]  [facility] [address]     Orthopedic follow-up clinic  Date of surgery: 4/27/2018 left knee ACL reconstruction with hamstring autograft, all inside repair medial meniscus left knee      HISTORY:  Returns 3 months s/p above. Doing very well. No pain. Doing cross fit.     EXAM:     General: Awake, Alert, and oriented. Articulates and communicates with a normal affect     Left Lower Extremity:    Incisions well healed without evidence of infection    Lachman 0. Negative. Pivot Shift negative. No slide. Stable to varus, valgus.    Range of motion 0-1 30.     Neurovascularly intact    IMAGING:  None    ASSESSMENT:  1. 3 months status post left knee anterior cruciate ligament and inside-out repair pair medial meniscus. She is doing very well.     PLAN:   She can progress to in-line running at this time. We discussed precautions in terms of avoiding dangerous activities and side to side or pivoting type activities until least 6 months.       Weightbearing: WBAT    Range of Motion: No range of motion restrictions.     Acitivity Restrictions:    May do straight line running at this time    No running distance or pace restrictions    No cutting, pivoting, or start-stop running    Goal to build strength, endurance, and confidence to allow sports in 3 months time (6 months from the date of surgery)    Brace: Discussed knee bracing options for sports including neoprene knee sleeve ACL functional bracing.     Discussed post-ACL reconstruction therapy program    Follow up: 3 months no XRays with an ACL functional test as completed by physical therapy.     Seen with Dr. Jaun Biggs.        Patient seen and examined with the resident.     Assesment: Left knee acl reconstruction, LM repair      Plan: Weightbearing: WBAT    Range of Motion: No range of motion restrictions.     Acitivity Restrictions:    May do straight line running at this time    No running distance or pace  restrictions    No cutting, pivoting, or start-stop running    Goal to build strength, endurance, and confidence to allow sports in 3 months time (6 months from the date of surgery)    Brace: Discussed knee bracing options for sports including neoprene knee sleeve ACL functional bracing.     Discussed post-ACL reconstruction therapy program    Follow up: 3 months no XRays with an ACL functional test as completed by physical therapy.      I agree with history, physical and imaging as well as the assessment and plan as detailed by Dr. Ordoñez.       Jaun Biggs MD

## 2018-08-01 NOTE — PROGRESS NOTES
PROGRESS  REPORT        SUBJECTIVE  Subjective: Pt notes continued improvement - no setbacks, no further instances of sharp pain. Progressing strength training independently at gym (crossfit)    Current pain level is 0/10  .     Initial Pain level: 4/10.   Changes in function:  Yes (See Goal flowsheet attached for changes in current functional level)  Adverse reaction to treatment or activity: None    OBJECTIVE  Changes noted in objective findings:  Yes,  Objective:   *see PN for functional testing*    ASSESSMENT/PLAN  Updated problem list and treatment plan: Diagnosis 1:  S/p L ACLR  Pain -  hot/cold therapy, manual therapy, splint/taping/bracing/orthotics, self management and education  Decreased ROM/flexibility - manual therapy, therapeutic exercise, therapeutic activity and home program  Decreased strength - therapeutic exercise, therapeutic activities and home program  Decreased proprioception - neuro re-education, therapeutic activities and home program  Edema - vasopneumatics, cold therapy and cryocuff  Impaired muscle performance - electric stimulation, neuro re-education and home program  STG/LTGs have been met or progress has been made towards goals:  Yes (See Goal flow sheet completed today.)  Assessment of Progress: The patient's condition is improving.  Self Management Plans:  Patient has been instructed in a home treatment program.  I have re-evaluated this patient and find that the nature, scope, duration and intensity of the therapy is appropriate for the medical condition of the patient.  Dianelys continues to require the following intervention to meet STG and LTG's:  PT    Recommendations:  This patient would benefit from continued therapy.     Frequency:  2 X a month, once daily  Duration:  for 3 months        Please refer to the daily flowsheet for treatment today, total treatment time and time spent performing 1:1 timed codes.

## 2018-08-01 NOTE — NURSING NOTE
Reason For Visit:   Chief Complaint   Patient presents with     Left Knee - Surgical Followup     DOS 4/27/18 Examination Under Anesthesia Left Knee, Left Anterior Cruciate Ligament Reconstruction, Hamstring Autograft, Meniscus Repair        Date of surgery: 4/27/18  Type of surgery:   PROCEDURE:  1. Examination under anesthesia left knee  2. Left knee arthroscopy  3. Left knee ACL reconstruction hamstring autograft  4. All inside repair of the medial meniscus left knee    Smoker: No  Request smoking cessation information: No    Pain Assessment  Patient Currently in Pain: No    There were no vitals taken for this visit.         Allergies   Allergen Reactions     Sulfa Drugs        Current Outpatient Prescriptions   Medication     acetaminophen (TYLENOL) 325 MG tablet     aspirin 325 MG EC tablet     BuPROPion HCl (WELLBUTRIN PO)     medroxyPROGESTERone (DEPO-PROVERA) 150 MG/ML injection     Naproxen Sodium (ALEVE PO)     hydrOXYzine (ATARAX) 25 MG tablet     ondansetron (ZOFRAN-ODT) 4 MG ODT tab     oxyCODONE IR (ROXICODONE) 5 MG tablet     senna-docusate (SENOKOT-S;PERICOLACE) 8.6-50 MG per tablet     No current facility-administered medications for this visit.            Annemarie Prabhakar, ATC

## 2018-08-01 NOTE — MR AVS SNAPSHOT
After Visit Summary   8/1/2018    Dianelys Wells    MRN: [id number]           Patient Information     Date Of Birth          1975        Visit Information        Provider Department      8/1/2018 10:20 AM Jaun Biggs MD [facility]        Today's Diagnoses     S/P ACL reconstruction    -  1       Follow-ups after your visit        Your next 10 appointments already scheduled     Aug 13, 2018  1:50 PM CDT   JENN Extremity with Timothy Gutierres PT   [facility] JENN ([facility])    [address] [place] [address] [phone]            Aug 27, 2018  1:50 PM CDT   JENN Extremity with Timothy Gutierres PT   [facility] JENN ([facility])    [address] [place] [address] [phone]              Who to contact     Please call your clinic at [phone] to:    Ask questions about your health    Make or cancel appointments    Discuss your medicines    Learn about your test results    Speak to your doctor            Additional Information About Your Visit        MyChart Information     [employer] gives you secure access to your electronic health record. If you see a primary care provider, you can also send messages to your care team and make appointments. If you have questions, please call your primary care clinic.  If you do not have a primary care provider, please call [phone] and they will assist you.      [employer] is an electronic gateway that provides easy, online access to your medical records. With [employer], you can request a clinic appointment, read your test results, renew a prescription or communicate with your care team.     To access your existing account, please contact your [facility] or call [phone] for assistance.        Care EveryWhere ID     This is your Care EveryWhere ID.  This could be used by other organizations to access your [place] medical records  [id number]         Blood Pressure from Last 3 Encounters:   04/27/18 113/71   04/20/18 121/81   03/05/18 129/85    Weight from Last 3 Encounters:   04/20/18 187 lb 6.4 oz (85 kg)   04/16/18 188 lb (85.3 kg)   03/05/18 188 lb 8 oz (85.5 kg)              Today, you had the following     No orders found for display       Primary Care Provider Office Phone # Fax #    Lazara Starks [phone]       [facility] [address]  [place] [address]        Equal Access to Services     DAVID [facility] : Hadii graciela padillao Soaubrie, waaxda luqadaha, qaybta kaalmada adeegyada, nikhil cruz . So [facility] [phone].    ATENCIÓN: Si habla español, tiene a granger disposición servicios gratuitos de asistencia lingüística. LlFirelands Regional Medical Center South Campus [phone].    We comply with applicable federal civil rights laws and [place] laws. We do not discriminate on the basis of race, color, national origin, age, disability, sex, sexual orientation, or gender identity.            Thank you!     Thank you for choosing [facility]  for your care. Our goal is always to provide you with excellent care. Hearing back from our patients is one way we can continue to improve our services. Please take a few minutes to complete the written survey that you may receive in the mail after your visit with us. Thank you!             Your Updated Medication List - Protect others around you: Learn how to safely use, store and throw away your medicines at www.disposemymeds.org.          This list is accurate as of 8/1/18  1:13 PM.  Always use your most recent med list.                   Brand Name Dispense Instructions for use Diagnosis    acetaminophen 325 MG tablet    TYLENOL    100 tablet    Take 2 tablets (650 mg) by mouth every 4 hours    Status post surgery       ALEVE PO      Take 220 mg by mouth        aspirin 325  MG EC tablet     28 tablet    Take 1 tablet (325 mg) by mouth daily To be taken for DVT Prophylaxis daily    Status post surgery       DEPO-PROVERA 150 MG/ML injection   Generic drug:  medroxyPROGESTERone      Inject 150 mg into the muscle every 3 months        hydrOXYzine 25 MG tablet    ATARAX    30 tablet    Take 1 tablet (25 mg) by mouth every 6 hours as needed for itching (and nausea)    Status post surgery       ondansetron 4 MG ODT tab    ZOFRAN-ODT    4 tablet    Take 1-2 tablets (4-8 mg) by mouth every 8 hours as needed for nausea Dissolve ON the tongue.    Status post surgery       oxyCODONE IR 5 MG tablet    ROXICODONE    20 tablet    Take 1-2 tablets (5-10 mg) by mouth every 6 hours as needed for severe pain maximum 6 tablet(s) per day    Status post surgery       senna-docusate 8.6-50 MG per tablet    SENOKOT-S;PERICOLACE    24 tablet    Take 1-2 tablets by mouth 2 times daily Take while on oral narcotics to prevent or treat constipation.    Status post surgery       WELLBUTRIN PO      Take 150 mg by mouth every morning

## 2018-08-13 ENCOUNTER — THERAPY VISIT (OUTPATIENT)
Dept: PHYSICAL THERAPY | Facility: CLINIC | Age: 43
End: 2018-08-13
Payer: COMMERCIAL

## 2018-08-13 DIAGNOSIS — R60.0 LOCALIZED EDEMA: ICD-10-CM

## 2018-08-13 DIAGNOSIS — Z98.890 S/P ACL RECONSTRUCTION: ICD-10-CM

## 2018-08-13 DIAGNOSIS — M25.562 LEFT KNEE PAIN, UNSPECIFIED CHRONICITY: ICD-10-CM

## 2018-08-13 PROCEDURE — 97112 NEUROMUSCULAR REEDUCATION: CPT | Mod: GP | Performed by: PHYSICAL THERAPIST

## 2018-08-13 PROCEDURE — 97530 THERAPEUTIC ACTIVITIES: CPT | Mod: GP | Performed by: PHYSICAL THERAPIST

## 2018-08-13 PROCEDURE — 97110 THERAPEUTIC EXERCISES: CPT | Mod: GP | Performed by: PHYSICAL THERAPIST

## 2018-08-27 ENCOUNTER — THERAPY VISIT (OUTPATIENT)
Dept: PHYSICAL THERAPY | Facility: CLINIC | Age: 43
End: 2018-08-27
Payer: COMMERCIAL

## 2018-08-27 DIAGNOSIS — Z98.890 S/P ACL RECONSTRUCTION: ICD-10-CM

## 2018-08-27 DIAGNOSIS — R60.0 LOCALIZED EDEMA: ICD-10-CM

## 2018-08-27 DIAGNOSIS — M25.562 LEFT KNEE PAIN, UNSPECIFIED CHRONICITY: ICD-10-CM

## 2018-08-27 PROCEDURE — 97112 NEUROMUSCULAR REEDUCATION: CPT | Mod: GP | Performed by: PHYSICAL THERAPIST

## 2018-08-27 PROCEDURE — 97530 THERAPEUTIC ACTIVITIES: CPT | Mod: GP | Performed by: PHYSICAL THERAPIST

## 2018-08-27 PROCEDURE — 97110 THERAPEUTIC EXERCISES: CPT | Mod: GP | Performed by: PHYSICAL THERAPIST

## 2018-09-18 ENCOUNTER — THERAPY VISIT (OUTPATIENT)
Dept: PHYSICAL THERAPY | Facility: CLINIC | Age: 43
End: 2018-09-18
Payer: COMMERCIAL

## 2018-09-18 DIAGNOSIS — Z98.890 S/P ACL RECONSTRUCTION: ICD-10-CM

## 2018-09-18 DIAGNOSIS — M25.562 LEFT KNEE PAIN, UNSPECIFIED CHRONICITY: ICD-10-CM

## 2018-09-18 DIAGNOSIS — R60.0 LOCALIZED EDEMA: ICD-10-CM

## 2018-09-18 PROCEDURE — 97530 THERAPEUTIC ACTIVITIES: CPT | Mod: GP | Performed by: PHYSICAL THERAPIST

## 2018-09-18 PROCEDURE — 97110 THERAPEUTIC EXERCISES: CPT | Mod: GP | Performed by: PHYSICAL THERAPIST

## 2018-09-18 PROCEDURE — 97140 MANUAL THERAPY 1/> REGIONS: CPT | Mod: GP | Performed by: PHYSICAL THERAPIST

## 2018-09-26 ENCOUNTER — THERAPY VISIT (OUTPATIENT)
Dept: PHYSICAL THERAPY | Facility: CLINIC | Age: 43
End: 2018-09-26
Payer: COMMERCIAL

## 2018-09-26 DIAGNOSIS — Z98.890 S/P ACL RECONSTRUCTION: ICD-10-CM

## 2018-09-26 DIAGNOSIS — R60.0 LOCALIZED EDEMA: ICD-10-CM

## 2018-09-26 DIAGNOSIS — M25.562 LEFT KNEE PAIN, UNSPECIFIED CHRONICITY: ICD-10-CM

## 2018-09-26 PROCEDURE — 97530 THERAPEUTIC ACTIVITIES: CPT | Mod: GP | Performed by: PHYSICAL THERAPIST

## 2018-09-26 PROCEDURE — 97140 MANUAL THERAPY 1/> REGIONS: CPT | Mod: GP | Performed by: PHYSICAL THERAPIST

## 2018-10-08 ENCOUNTER — THERAPY VISIT (OUTPATIENT)
Dept: PHYSICAL THERAPY | Facility: CLINIC | Age: 43
End: 2018-10-08
Payer: COMMERCIAL

## 2018-10-08 DIAGNOSIS — Z98.890 S/P ACL RECONSTRUCTION: ICD-10-CM

## 2018-10-08 DIAGNOSIS — R60.0 LOCALIZED EDEMA: ICD-10-CM

## 2018-10-08 DIAGNOSIS — M25.562 LEFT KNEE PAIN, UNSPECIFIED CHRONICITY: ICD-10-CM

## 2018-10-08 PROCEDURE — 97140 MANUAL THERAPY 1/> REGIONS: CPT | Mod: GP | Performed by: PHYSICAL THERAPIST

## 2018-10-08 PROCEDURE — 97530 THERAPEUTIC ACTIVITIES: CPT | Mod: GP | Performed by: PHYSICAL THERAPIST

## 2018-10-08 PROCEDURE — 97112 NEUROMUSCULAR REEDUCATION: CPT | Mod: GP | Performed by: PHYSICAL THERAPIST

## 2018-10-23 ENCOUNTER — THERAPY VISIT (OUTPATIENT)
Dept: PHYSICAL THERAPY | Facility: CLINIC | Age: 43
End: 2018-10-23
Payer: COMMERCIAL

## 2018-10-23 DIAGNOSIS — M25.562 LEFT KNEE PAIN, UNSPECIFIED CHRONICITY: ICD-10-CM

## 2018-10-23 DIAGNOSIS — Z98.890 S/P ACL RECONSTRUCTION: ICD-10-CM

## 2018-10-23 DIAGNOSIS — R60.0 LOCALIZED EDEMA: ICD-10-CM

## 2018-10-23 PROCEDURE — 97110 THERAPEUTIC EXERCISES: CPT | Mod: GP | Performed by: PHYSICAL THERAPIST

## 2018-10-23 PROCEDURE — 97112 NEUROMUSCULAR REEDUCATION: CPT | Mod: GP | Performed by: PHYSICAL THERAPIST

## 2018-10-23 PROCEDURE — 97530 THERAPEUTIC ACTIVITIES: CPT | Mod: GP | Performed by: PHYSICAL THERAPIST

## 2018-10-23 NOTE — PROGRESS NOTES
Lower Extremity Physical Performance Testing    Surgery/Injury: Left ACL-R with Meniscus Repair  Involved Extremity: left   Date of Surgery/Injury: 2018    Surgeon/MD: Dr. Biggs  Therapist performing test: Reta Kennedy DPT, OCS Primary Treating Therapist: Jair    Patient subjective symptom/function report: No complaints. No longer having any anterior knee pain and feels stronger in the knee itself. Has been jumping and running without issue at the gym.     Evaluation chosen: Return to Function (Level I): Balance and Muscle Strength. Allowed at or after 2-4 months post-op ACL     Return to Function (Level I): Balance, Muscle Strength   Testing Protocol: Recorded values = best effort of 3 attempts (after 2 practice attempts).    Single Leg Stand and Reach Anterior/Medial Anterior/Lateral   Uninvolved Extremity 75 cm. 60 cm.   Involved Extremity 70 cm.   60 cm.   LSI% 93% 100 %     Single Leg Balance  Eyes Closed Max = 60 seconds   Uninvolved Extremity 60 seconds   Involved Extremity 60 seconds   LSI% 100%     Single Leg Squat    Uninvolved Extremity 92 degrees   Involved Extremity 80 degrees   LSI% 86%     Retro Step    Uninvolved Extremity 12 in.   Involved Extremity 7 in.     LSI% 58%     Anterior Star Reach    Uninvolved Extremity 41 cm.   Involved Extremity 36 cm.     LSI% 87%       Return to Fitness (Level II): Muscle Endurance, Power. Allowed at or after 4-6 months post-op ACL     Return to Fitness (Level II): Muscle Endurance, Power   Level II Functional Prerequisites:   1) All Level I tests ?85% of uninvolved side or maximal value, and  2) Bilateral squats ?90  knee flexion x 20 reps with good trunk, L/E alignment control.    Perceived Exertion Ratin to 5 point scale, (0) Very Easy << >> (5) Maximal Exertion.  2 verbal cuing episodes allowed for alignment correction before testing terminated.  Only reps completed with good alignment counted toward total reps.    Star Excursion Balance Test  Posteromedial Reach Posterolateral Reach   Uninvolved Extremity 72 cm 72 cm   Involved Extremity 70 cm 68 cm   LSI% 97% 94%     Single Leg Squat Endurance (Reps to 60 KF, 60bpm x 2 minutes) X/60 Reps Percent Return   Uninvolved Extremity 35/60 reps 58%   Involved Extremity 20/60 reps 33%   LSI% 57%      Single Leg Hop    Uninvolved Extremity 0.95 M   Involved Extremity 0.50 M   LSI% 52%             Assessment/Plan:  Dianelys presents to physical therapy 6 months s/p the above procedure. She has progressed well despite having an onset of patellar tendinopathy 2 months ago. She has returned to all activities at the gym and has no soreness or increased swelling. She has questions regarding return to sport and return to dancing which were answered today. Her functional test today revealed continued quad weakness and a lack of trunk control/proprioception. She has not started doing any single leg jumping and so her LSI on that task is not surprising. She will follow-up in 1 month to further discuss return to agility and sport.     Exercises Instructed per Test Findings:  1) Quad strengthening  2) Power - plyometrics/jumping  3) Balance and proprioception

## 2018-10-23 NOTE — MR AVS SNAPSHOT
After Visit Summary   10/23/2018    Dianelys Wells    MRN: [id number]           Patient Information     Date Of Birth          1975        Visit Information        Provider Department      10/23/2018 4:10 PM Reta Kennedy PT [facility] JENN        Today's Diagnoses     Left knee pain, unspecified chronicity        Localized edema        S/P ACL reconstruction           Follow-ups after your visit        Your next 10 appointments already scheduled     Oct 31, 2018 10:30 AM CDT   (Arrive by 10:15 AM)   RETURN KNEE with Jaun Biggs MD   [facility] ([facility])    [address] [place] [id number] [phone]            Jan 02, 2019  9:30 AM CST   JENN Extremity with Reta Kennedy PT   [facility] JENN ([facility])    [address] [place] [address] [phone]              Who to contact     If you have questions or need follow up information about today's clinic visit or your schedule please contact [facility] JENN directly at [phone].  Normal or non-critical lab and imaging results will be communicated to you by MyChart, letter or phone within 4 business days after the clinic has received the results. If you do not hear from us within 7 days, please contact the clinic through [employer] or phone. If you have a critical or abnormal lab result, we will notify you by phone as soon as possible.  Submit refill requests through [email or web address] or call your pharmacy and they will forward the refill request to us. Please allow 3 business days for your refill to be completed.          Additional Information About Your Visit        MyChart Information     [email or web address] gives you secure access to your electronic health record. If you see a primary care provider, you can also send messages to your care team and make appointments. If  you have questions, please call your primary care clinic.  If you do not have a primary care provider, please call [phone] and they will assist you.        Care EveryWhere ID     This is your Care EveryWhere ID. This could be used by other organizations to access your [place] medical records  [id number]         Blood Pressure from Last 3 Encounters:   04/27/18 113/71   04/20/18 121/81   03/05/18 129/85    Weight from Last 3 Encounters:   04/20/18 85 kg (187 lb 6.4 oz)   04/16/18 85.3 kg (188 lb)   03/05/18 85.5 kg (188 lb 8 oz)              We Performed the Following     NEUROMUSCULAR RE-EDUCATION     THERAPEUTIC ACTIVITIES     THERAPEUTIC EXERCISES        Primary Care Provider Office Phone # Fax #    Lazara Starks [phone]       [facility] [address]  [place] [address]        Equal Access to Services     [facility] : Hadii aad ku hadasho Sodavidali, waaxda luqadaha, qaybta kaalmada adeegyada, waxay abbyin haysusien arben cruz . So [facility] [phone].    ATENCIÓN: Si habla español, tiene a granger disposición servicios gratuitos de asistencia lingüística. Llame al [phone].    We comply with applicable federal civil rights laws and [place] laws. We do not discriminate on the basis of race, color, national origin, age, disability, sex, sexual orientation, or gender identity.            Thank you!     Thank you for choosing [facility]ERAP[facility] JENN  for your care. Our goal is always to provide you with excellent care. Hearing back from our patients is one way we can continue to improve our services. Please take a few minutes to complete the written survey that you may receive in the mail after your visit with us. Thank you!             Your Updated Medication List - Protect others around you: Learn how to safely use, store and throw away your medicines at www.disposemymeds.org.          This list is accurate as of 10/23/18  5:04 PM.  Always use your most  recent med list.                   Brand Name Dispense Instructions for use Diagnosis    acetaminophen 325 MG tablet    TYLENOL    100 tablet    Take 2 tablets (650 mg) by mouth every 4 hours    Status post surgery       ALEVE PO      Take 220 mg by mouth        aspirin 325 MG EC tablet     28 tablet    Take 1 tablet (325 mg) by mouth daily To be taken for DVT Prophylaxis daily    Status post surgery       DEPO-PROVERA 150 MG/ML injection   Generic drug:  medroxyPROGESTERone      Inject 150 mg into the muscle every 3 months        hydrOXYzine 25 MG tablet    ATARAX    30 tablet    Take 1 tablet (25 mg) by mouth every 6 hours as needed for itching (and nausea)    Status post surgery       ondansetron 4 MG ODT tab    ZOFRAN-ODT    4 tablet    Take 1-2 tablets (4-8 mg) by mouth every 8 hours as needed for nausea Dissolve ON the tongue.    Status post surgery       oxyCODONE IR 5 MG tablet    ROXICODONE    20 tablet    Take 1-2 tablets (5-10 mg) by mouth every 6 hours as needed for severe pain maximum 6 tablet(s) per day    Status post surgery       senna-docusate 8.6-50 MG per tablet    SENOKOT-S;PERICOLACE    24 tablet    Take 1-2 tablets by mouth 2 times daily Take while on oral narcotics to prevent or treat constipation.    Status post surgery       WELLBUTRIN PO      Take 150 mg by mouth every morning

## 2018-10-31 ENCOUNTER — OFFICE VISIT (OUTPATIENT)
Dept: ORTHOPEDICS | Facility: CLINIC | Age: 43
End: 2018-10-31
Payer: COMMERCIAL

## 2018-10-31 DIAGNOSIS — Z98.890 S/P ACL RECONSTRUCTION: Primary | ICD-10-CM

## 2018-10-31 NOTE — PROGRESS NOTES
Patient seen and examined with the resident.     Assesment:  6 months following hs autograft acl    Milian's neuroma      Plan: Weightbearing: No weight bearing restriction    Range of Motion: No range of motion restrictions.     Acitivity Restrictions:    Begin to return to sports in a structured manner     Goal to return to game competition between 7-10 months    Follow up: 1 year with AP/Lateral radiographs. Sooner if problems arise     We will have her see foot surgery for her Milian's neuroma    I agree with history, physical and imaging as well as the assessment and plan as detailed by Dr. Kaplan.

## 2018-10-31 NOTE — MR AVS SNAPSHOT
After Visit Summary   10/31/2018    Dianelys Wells    MRN: [id number]           Patient Information     Date Of Birth          1975        Visit Information        Provider Department      10/31/2018 10:30 AM Jaun Biggs MD [facility]         Follow-ups after your visit        Your next 10 appointments already scheduled     Dec 04, 2018  8:40 AM CST   (Arrive by 8:25 AM)   NEW FOOT/ANKLE with Nawaf Finch MD   [facility] ([facility])    [address] [place] [address] [phone]            Jan 02, 2019  9:30 AM CST   JENN Extremity with Reta Kennedy PT   [facility] JENN ([facility])    [address] [place] [address] [phone]              Who to contact     Please call your clinic at [phone] to:    Ask questions about your health    Make or cancel appointments    Discuss your medicines    Learn about your test results    Speak to your doctor            Additional Information About Your Visit        [employer] Information     Signal gives you secure access to your electronic health record. If you see a primary care provider, you can also send messages to your care team and make appointments. If you have questions, please call your primary care clinic.  If you do not have a primary care provider, please call [phone] and they will assist you.      Signal is an electronic gateway that provides easy, online access to your medical records. With Signal, you can request a clinic appointment, read your test results, renew a prescription or communicate with your care team.     To access your existing account, please contact your [facility] or call [phone] for assistance.        Care EveryWhere ID     This is your Care EveryWhere ID. This could be used by other  organizations to access your [place] medical records  [id number]         Blood Pressure from Last 3 Encounters:   04/27/18 113/71   04/20/18 121/81   03/05/18 129/85    Weight from Last 3 Encounters:   04/20/18 187 lb 6.4 oz (85 kg)   04/16/18 188 lb (85.3 kg)   03/05/18 188 lb 8 oz (85.5 kg)              Today, you had the following     No orders found for display       Primary Care Provider Office Phone # Fax #    Lazara Starks [phone]       [facility] [address]  [place] [address]        Equal Access to Services     DAVID NOE : Hadii graciela Jansen, waforrestda cece, qaybta kaalmada harriet, nikhil crouch. So [facility] [phone].    ATENCIÓN: Si habla español, tiene a granger disposición servicios gratuitos de asistencia lingüística. Llame al [phone].    We comply with applicable federal civil rights laws and [place] laws. We do not discriminate on the basis of race, color, national origin, age, disability, sex, sexual orientation, or gender identity.            Thank you!     Thank you for choosing [facility]  for your care. Our goal is always to provide you with excellent care. Hearing back from our patients is one way we can continue to improve our services. Please take a few minutes to complete the written survey that you may receive in the mail after your visit with us. Thank you!             Your Updated Medication List - Protect others around you: Learn how to safely use, store and throw away your medicines at www.disposemymeds.org.          This list is accurate as of 10/31/18 11:02 AM.  Always use your most recent med list.                   Brand Name Dispense Instructions for use Diagnosis    acetaminophen 325 MG tablet    TYLENOL    100 tablet    Take 2 tablets (650 mg) by mouth every 4 hours    Status post surgery       ALEVE PO      Take 220 mg by mouth        aspirin 325 MG EC tablet     28 tablet     Take 1 tablet (325 mg) by mouth daily To be taken for DVT Prophylaxis daily    Status post surgery       DEPO-PROVERA 150 MG/ML injection   Generic drug:  medroxyPROGESTERone      Inject 150 mg into the muscle every 3 months        hydrOXYzine 25 MG tablet    ATARAX    30 tablet    Take 1 tablet (25 mg) by mouth every 6 hours as needed for itching (and nausea)    Status post surgery       ondansetron 4 MG ODT tab    ZOFRAN-ODT    4 tablet    Take 1-2 tablets (4-8 mg) by mouth every 8 hours as needed for nausea Dissolve ON the tongue.    Status post surgery       oxyCODONE IR 5 MG tablet    ROXICODONE    20 tablet    Take 1-2 tablets (5-10 mg) by mouth every 6 hours as needed for severe pain maximum 6 tablet(s) per day    Status post surgery       senna-docusate 8.6-50 MG per tablet    SENOKOT-S;PERICOLACE    24 tablet    Take 1-2 tablets by mouth 2 times daily Take while on oral narcotics to prevent or treat constipation.    Status post surgery       WELLBUTRIN PO      Take 150 mg by mouth every morning

## 2018-10-31 NOTE — NURSING NOTE
Reason For Visit:   Chief Complaint   Patient presents with     Surgical Followup     DOS 4/27/18 Examination Under Anesthesia Left Knee, Left Anterior Cruciate Ligament Reconstruction, Hamstring Autograft, Meniscus Repair      Date of surgery: 4/27/18  Type of surgery:   PROCEDURE:  1. Examination under anesthesia left knee  2. Left knee arthroscopy  3. Left knee ACL reconstruction hamstring autograft  4. All inside repair of the medial meniscus left knee    Smoker: No  Request smoking cessation information: No    Pain Assessment  Patient Currently in Pain: No    There were no vitals taken for this visit.         Allergies   Allergen Reactions     Sulfa Drugs        Current Outpatient Prescriptions   Medication     aspirin 325 MG EC tablet     BuPROPion HCl (WELLBUTRIN PO)     medroxyPROGESTERone (DEPO-PROVERA) 150 MG/ML injection     Naproxen Sodium (ALEVE PO)     acetaminophen (TYLENOL) 325 MG tablet     hydrOXYzine (ATARAX) 25 MG tablet     ondansetron (ZOFRAN-ODT) 4 MG ODT tab     oxyCODONE IR (ROXICODONE) 5 MG tablet     senna-docusate (SENOKOT-S;PERICOLACE) 8.6-50 MG per tablet     No current facility-administered medications for this visit.          Annemarie Prabhakar, ATC'

## 2018-10-31 NOTE — LETTER
10/31/2018      RE: Dianelys Wells  [address]  [facility] [address]       Orthopedic follow-up clinic  Date of surgery: 4/27/2018 left knee ACL reconstruction with hamstring autograft, all inside repair medial meniscus left knee      HISTORY:  Returns 6 months s/p above. Doing very well. No pain. Doing cross fit though she does not feel she is for to participating at the full level.  She is progressed back to running and jump rope but feels that her running is limited by pain in her forefoot bilaterally which is concerning for Milian's neuroma.  She is wondering if she can be referred to see a provider for this.    EXAM:     General: Awake, Alert, and oriented. Articulates and communicates with a normal affect     Left Lower Extremity:    Incisions well healed without evidence of infection    Lachman 0. Negative. Pivot Shift negative. Stable to varus, valgus.    Range of motion 0-130.     Neurovascularly intact    IMAGING:  None    ASSESSMENT:  1. 6 months status post left knee anterior cruciate ligament and inside-out repair pair medial meniscus. She is doing very well.     PLAN:   She can continue her activities as tolerated and progress back to full participation in cross fit using a commonsense approach.     She also brought to her attention that she feels that she experienced is experiencing some pain in the plantar aspect of her forefoot bilaterally which is concerning for Milian's neuroma.  For this reason we would like to refer her to 1 of our foot and ankle specialist to discuss treatment options.    Weightbearing: No weight bearing restriction  Range of Motion: No range of motion restrictions.   Acitivity Restrictions:  Begin to return to sports in a structured manner   Goal to return to game competition between 7-10 months      Follow up: 1 year with AP/Lateral radiographs. Sooner if problems arise.  If she is doing well at 1 year she can feel free to cancel this appointment    Seen with  Dr. Jaun Biggs.      Answers for HPI/ROS submitted by the patient on 10/31/2018   PHQ-2 Score: 0      Patient seen and examined with the resident.     Assesment:  6 months following hs autograft acl    Milian's neuroma      Plan: Weightbearing: No weight bearing restriction    Range of Motion: No range of motion restrictions.     Acitivity Restrictions:    Begin to return to sports in a structured manner     Goal to return to game competition between 7-10 months    Follow up: 1 year with AP/Lateral radiographs. Sooner if problems arise     We will have her see foot surgery for her Milian's neuroma    I agree with history, physical and imaging as well as the assessment and plan as detailed by Dr. Kaplan.       Jaun Biggs MD

## 2018-10-31 NOTE — PROGRESS NOTES
Orthopedic follow-up clinic  Date of surgery: 4/27/2018 left knee ACL reconstruction with hamstring autograft, all inside repair medial meniscus left knee      HISTORY:  Returns 6 months s/p above. Doing very well. No pain. Doing cross fit though she does not feel she is for to participating at the full level.  She is progressed back to running and jump rope but feels that her running is limited by pain in her forefoot bilaterally which is concerning for Milian's neuroma.  She is wondering if she can be referred to see a provider for this.    EXAM:     General: Awake, Alert, and oriented. Articulates and communicates with a normal affect     Left Lower Extremity:    Incisions well healed without evidence of infection    Lachman 0. Negative. Pivot Shift negative. Stable to varus, valgus.    Range of motion 0-130.     Neurovascularly intact    IMAGING:  None    ASSESSMENT:  1. 6 months status post left knee anterior cruciate ligament and inside-out repair pair medial meniscus. She is doing very well.     PLAN:   She can continue her activities as tolerated and progress back to full participation in cross fit using a commonsense approach.     She also brought to her attention that she feels that she experienced is experiencing some pain in the plantar aspect of her forefoot bilaterally which is concerning for Milian's neuroma.  For this reason we would like to refer her to 1 of our foot and ankle specialist to discuss treatment options.    Weightbearing: No weight bearing restriction  Range of Motion: No range of motion restrictions.   Acitivity Restrictions:  Begin to return to sports in a structured manner   Goal to return to game competition between 7-10 months      Follow up: 1 year with AP/Lateral radiographs. Sooner if problems arise.  If she is doing well at 1 year she can feel free to cancel this appointment    Seen with Dr. Jaun Biggs.      Answers for HPI/ROS submitted by the patient on 10/31/2018    PHQ-2 Score: 0

## 2018-11-27 NOTE — TELEPHONE ENCOUNTER
FUTURE VISIT INFORMATION      FUTURE VISIT INFORMATION:    Date: 12/4/18    Time: 0840    Location: [facility]  REFERRAL INFORMATION:    Referring provider:  DR RYAN    Referring providers clinic:  [facility]TH[facility]    Reason for visit/diagnosis  NEUROMA    RECORDS REQUESTED FROM:       Clinic name Comments Records Status Imaging Status                                         RECORDS IN CHART

## 2018-11-28 DIAGNOSIS — M79.672 LEFT FOOT PAIN: Primary | ICD-10-CM

## 2018-12-04 ENCOUNTER — OFFICE VISIT (OUTPATIENT)
Dept: ORTHOPEDICS | Facility: CLINIC | Age: 43
End: 2018-12-04
Payer: COMMERCIAL

## 2018-12-04 ENCOUNTER — PRE VISIT (OUTPATIENT)
Dept: ORTHOPEDICS | Facility: CLINIC | Age: 43
End: 2018-12-04

## 2018-12-04 ENCOUNTER — RADIANT APPOINTMENT (OUTPATIENT)
Dept: GENERAL RADIOLOGY | Facility: CLINIC | Age: 43
End: 2018-12-04
Attending: ORTHOPAEDIC SURGERY
Payer: COMMERCIAL

## 2018-12-04 VITALS — HEIGHT: 62 IN | WEIGHT: 173.9 LBS | BODY MASS INDEX: 32 KG/M2

## 2018-12-04 DIAGNOSIS — M25.572 PAIN IN JOINT, ANKLE AND FOOT, LEFT: ICD-10-CM

## 2018-12-04 DIAGNOSIS — M79.672 LEFT FOOT PAIN: ICD-10-CM

## 2018-12-04 DIAGNOSIS — M25.571 PAIN IN JOINT, ANKLE AND FOOT, RIGHT: Primary | ICD-10-CM

## 2018-12-04 NOTE — PROGRESS NOTES
CHIEF COMPLAINT:  Bilateral foot pain, right worse than left.      HISTORY OF PRESENT ILLNESS:  Ms. Wells is a 43-year-old female who presents today for evaluation of her feet.  The patient reports to have a long history of pain and discomfort, especially with running.  Reports to have pain along the plantar aspect of the ball of her feet with running and this has been going on for a number of years.  More recently now for the past year she reports even to have some pain and discomfort even without running if she does prolonged walking and standing.      The patient is very clear about the fact that she prefers to have shoes on than not.  She also reports to have some sensitivity towards walking on hard surfaces while being barefoot if, in fact, the foot has been aggravated prior to that incident with either running or any other physical activity.      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  The patient has a desire to try to resume running as well.      Presents today for discussion of treatment options.      PAST MEDICAL HISTORY:  None.      PAST SURGICAL HISTORY:  ACL reconstruction.      DRUG ALLERGIES:  Sulfa drugs.      CURRENT MEDICATIONS:  Please refer to encounter form.      PHYSICAL EXAMINATION:  On today's visit, she presents as a pleasant female in no apparent distress with a height of 5 feet 1 inch and a weight of 173 pounds.  Denies to have any constitutional symptoms.      On today's visit, she presents with full range of motion of the right ankle, hindfoot and midfoot joints.  CMS intact.  Skin intact.  There is pain with palpation of the second and third metatarsal heads plantarly.  There is also some discomfort with palpation of the intermetatarsal head space but seems to be the most painful area is the metatarsal heads.  There are no lesser toe deformities.  Otherwise, exam is  unremarkable.      She presents with similar findings but to a much lesser degree along the left foot.      RADIOGRAPHIC EVALUATION:  Bilateral foot x-rays were reviewed today which were grossly nondiagnostic except for the presence of long second and third metatarsals compatible with Milian foot.      ASSESSMENT:  Bilateral foot metatarsalgia.      PLAN:  I discussed with the patient natural history of her condition as well as the unlikelihood of creating any irreversible damage to her feet.  For the time being, the patient is going to proceed with the use of custom orthotics with a metatarsal pad and she will follow up on a p.r.n. basis.  She has no activity restrictions.      I encouraged the patient to visit with us if the residual pain after the use of custom orthotics is not acceptable to her.      All questions were answered.  The patient was pleased with the discussion.      TT 30 minutes, CT 20 minutes.

## 2018-12-04 NOTE — NURSING NOTE
"Reason For Visit:   Chief Complaint   Patient presents with     Consult     bilateral foot pain Right worse than left       Ht 1.57 m (5' 1.81\")  Wt 78.9 kg (173 lb 14.4 oz)  BMI 32 kg/m2    Pain Assessment  Patient Currently in Pain: Denies (pain only when jogging )    Rubi Marshall ATC    "

## 2018-12-04 NOTE — MR AVS SNAPSHOT
After Visit Summary   12/4/2018    Dianelys Wells    MRN: [id number]           Patient Information     Date Of Birth          1975        Visit Information        Provider Department      12/4/2018 8:40 AM Naw[facility] Finch [facility]        Today's Diagnoses     Pain in joint, ankle and foot, right    -  1    Pain in joint, ankle and foot, left           Follow-ups after your visit        Additional Services     ORTHOTICS REFERRAL (Foot and Ankle)       Please be aware that coverage of these services is subject to the terms and limitations of your health insurance plan.  Call member services at your health plan with any benefit or coverage questions.      Please bring the following to your appointment:    >>   Any x-rays, CTs or MRIs which have been performed.  Contact the facility where they were done to arrange for  prior to your scheduled appointment.  Any new CT, MRI or other procedures ordered by your specialist must be performed at a [facility] facility or coordinated by your clinic's referral office.    >>   List of current medications   >>   This referral request   >>   Any documents/labs given to you for this referral    ==This Referral PRINTS in the [facility] (ORTHOTICS & PROSTHETICS) Central scheduling office ==     The [facility] Central Scheduling staff will contact patient to arrange appointments. Central Scheduling Phone #:  [place], [place]  [phone]     Orthotics: Foot Orthotics    FOOT AND ANKLE ORTHOTIC PRESCRIPTION:  Custom orthotics with metatarsal pad bilaterally                  Your next 10 appointments already scheduled     Jan 02, 2019  9:30 AM CST   [facility]ENN Extremity with ALBERTO [facility] ([facility])    [address] [place] [id number] [phone]              Who to contact     Please call your clinic at [phone]  "to:    Ask questions about your health    Make or cancel appointments    Discuss your medicines    Learn about your test results    Speak to your doctor            Additional Information About Your Visit        [employer] Information     [employer] gives you secure access to your electronic health record. If you see a primary care provider, you can also send messages to your care team and make appointments. If you have questions, please call your primary care clinic.  If you do not have a primary care provider, please call [phone] and they will assist you.      [employer] is an electronic gateway that provides easy, online access to your medical records. With [employer], you can request a clinic appointment, read your test results, renew a prescription or communicate with your care team.     To access your existing account, please contact your [facility] or call [phone] for assistance.        Care EveryWhere ID     This is your Care EveryWhere ID. This could be used by other organizations to access your [facility] medical records  [id number]        Your Vitals Were     Height BMI (Body Mass Index)                1.57 m (5' 1.81\") 32 kg/m2           Blood Pressure from Last 3 Encounters:   04/27/18 113/71   04/20/18 121/81   03/05/18 129/85    Weight from Last 3 Encounters:   12/04/18 78.9 kg (173 lb 14.4 oz)   04/20/18 85 kg (187 lb 6.4 oz)   04/16/18 85.3 kg (188 lb)              We Performed the Following     ORTHOTICS REFERRAL (Foot and Ankle)        Primary Care Provider Office Phone # Fax #    Lazara Starks [phone]       [facility] [address]  [place] [address]        Equal Access to Services     [facility] AH: Hadii graciela Jansen, nitesh zhao, nikhil galloway. So [facility] [phone].    ATENCIÓN: Si habla español, tiene a granger disposición servicios gratuitos de asistencia " lingüísticaPrimo Sin al [phone].    We comply with applicable federal civil rights laws and [place] laws. We do not discriminate on the basis of race, color, national origin, age, disability, sex, sexual orientation, or gender identity.            Thank you!     Thank you for choosing [facility]  for your care. Our goal is always to provide you with excellent care. Hearing back from our patients is one way we can continue to improve our services. Please take a few minutes to complete the written survey that you may receive in the mail after your visit with us. Thank you!             Your Updated Medication List - Protect others around you: Learn how to safely use, store and throw away your medicines at www.disposemymeds.org.          This list is accurate as of 12/4/18 11:59 PM.  Always use your most recent med list.                   Brand Name Dispense Instructions for use Diagnosis    acetaminophen 325 MG tablet    TYLENOL    100 tablet    Take 2 tablets (650 mg) by mouth every 4 hours    Status post surgery       ALEVE PO      Take 220 mg by mouth        aspirin 325 MG EC tablet    ASA    28 tablet    Take 1 tablet (325 mg) by mouth daily To be taken for DVT Prophylaxis daily    Status post surgery       DEPO-PROVERA 150 MG/ML IM injection   Generic drug:  medroxyPROGESTERone      Inject 150 mg into the muscle every 3 months        hydrOXYzine 25 MG tablet    ATARAX    30 tablet    Take 1 tablet (25 mg) by mouth every 6 hours as needed for itching (and nausea)    Status post surgery       ondansetron 4 MG ODT tab    ZOFRAN-ODT    4 tablet    Take 1-2 tablets (4-8 mg) by mouth every 8 hours as needed for nausea Dissolve ON the tongue.    Status post surgery       oxyCODONE 5 MG tablet    ROXICODONE    20 tablet    Take 1-2 tablets (5-10 mg) by mouth every 6 hours as needed for severe pain maximum 6 tablet(s) per day    Status post surgery       senna-docusate 8.6-50 MG tablet     SENOKOT-S/PERICOLACE    24 tablet    Take 1-2 tablets by mouth 2 times daily Take while on oral narcotics to prevent or treat constipation.    Status post surgery       WELLBUTRIN PO      Take 150 mg by mouth every morning

## 2018-12-04 NOTE — LETTER
12/4/2018       RE: Dianelys Wells  [address] [facility] [address]     Dear Colleague,    Thank you for referring your patient, Dianelys Wells, to the [facility]. Please see a copy of my visit note below.    CHIEF COMPLAINT:  Bilateral foot pain, right worse than left.      HISTORY OF PRESENT ILLNESS:  Ms. Wells is a 43-year-old female who presents today for evaluation of her feet.  The patient reports to have a long history of pain and discomfort, especially with running.  Reports to have pain along the plantar aspect of the ball of her feet with running and this has been going on for a number of years.  More recently now for the past year she reports even to have some pain and discomfort even without running if she does prolonged walking and standing.      The patient is very clear about the fact that she prefers to have shoes on than not.  She also reports to have some sensitivity towards walking on hard surfaces while being barefoot if, in fact, the foot has been aggravated prior to that incident with either running or any other physical activity.      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  The patient has a desire to try to resume running as well.      Presents today for discussion of treatment options.      PAST MEDICAL HISTORY:  None.      PAST SURGICAL HISTORY:  ACL reconstruction.      DRUG ALLERGIES:  Sulfa drugs.      CURRENT MEDICATIONS:  Please refer to encounter form.      PHYSICAL EXAMINATION:  On today's visit, she presents as a pleasant female in no apparent distress with a height of 5 feet 1 inch and a weight of 173 pounds.  Denies to have any constitutional symptoms.      On today's visit, she presents with full range of motion of the right ankle, hindfoot and midfoot joints.  CMS intact.  Skin  intact.  There is pain with palpation of the second and third metatarsal heads plantarly.  There is also some discomfort with palpation of the intermetatarsal head space but seems to be the most painful area is the metatarsal heads.  There are no lesser toe deformities.  Otherwise, exam is unremarkable.      She presents with similar findings but to a much lesser degree along the left foot.      RADIOGRAPHIC EVALUATION:  Bilateral foot x-rays were reviewed today which were grossly nondiagnostic except for the presence of long second and third metatarsals compatible with Milian foot.      ASSESSMENT:  Bilateral foot metatarsalgia.      PLAN:  I discussed with the patient natural history of her condition as well as the unlikelihood of creating any irreversible damage to her feet.  For the time being, the patient is going to proceed with the use of custom orthotics with a metatarsal pad and she will follow up on a p.r.n. basis.  She has no activity restrictions.      I encouraged the patient to visit with us if the residual pain after the use of custom orthotics is not acceptable to her.      All questions were answered.  The patient was pleased with the discussion.      TT 30 minutes, CT 20 minutes.         Again, thank you for allowing me to participate in the care of your patient.      Sincerely,    Nawaf Finch MD

## 2019-01-02 ENCOUNTER — THERAPY VISIT (OUTPATIENT)
Dept: PHYSICAL THERAPY | Facility: CLINIC | Age: 44
End: 2019-01-02
Payer: COMMERCIAL

## 2019-01-02 DIAGNOSIS — R60.0 LOCALIZED EDEMA: ICD-10-CM

## 2019-01-02 DIAGNOSIS — Z98.890 S/P ACL RECONSTRUCTION: ICD-10-CM

## 2019-01-02 DIAGNOSIS — M25.562 LEFT KNEE PAIN, UNSPECIFIED CHRONICITY: ICD-10-CM

## 2019-01-02 PROCEDURE — 97530 THERAPEUTIC ACTIVITIES: CPT | Mod: GP | Performed by: PHYSICAL THERAPIST

## 2019-01-02 PROCEDURE — 97110 THERAPEUTIC EXERCISES: CPT | Mod: GP | Performed by: PHYSICAL THERAPIST

## 2019-01-02 NOTE — PROGRESS NOTES
Anterior Excursion Test Anterior Reach   Uninvolved Extremity 52 cm   Involved Extremity 37 cm   LSI% 71%     Single Leg Squat    Uninvolved Extremity 90 deg   Involved Extremity (required light UE touchdown and poor proprioception) 79 deg   LSI% 87%     Retro Step Up     Uninvolved Extremity 12 inches   Involved Extremity 4 inches   LSI% 33%

## 2019-02-12 ENCOUNTER — THERAPY VISIT (OUTPATIENT)
Dept: PHYSICAL THERAPY | Facility: CLINIC | Age: 44
End: 2019-02-12
Payer: COMMERCIAL

## 2019-02-12 DIAGNOSIS — R60.0 LOCALIZED EDEMA: ICD-10-CM

## 2019-02-12 DIAGNOSIS — M25.562 LEFT KNEE PAIN, UNSPECIFIED CHRONICITY: ICD-10-CM

## 2019-02-12 DIAGNOSIS — Z98.890 S/P ACL RECONSTRUCTION: ICD-10-CM

## 2019-02-12 PROCEDURE — 97140 MANUAL THERAPY 1/> REGIONS: CPT | Mod: GP | Performed by: PHYSICAL THERAPIST

## 2019-02-12 PROCEDURE — 97110 THERAPEUTIC EXERCISES: CPT | Mod: GP | Performed by: PHYSICAL THERAPIST

## 2019-02-12 PROCEDURE — 97530 THERAPEUTIC ACTIVITIES: CPT | Mod: GP | Performed by: PHYSICAL THERAPIST

## 2019-03-05 ENCOUNTER — THERAPY VISIT (OUTPATIENT)
Dept: PHYSICAL THERAPY | Facility: CLINIC | Age: 44
End: 2019-03-05
Payer: COMMERCIAL

## 2019-03-05 DIAGNOSIS — M25.562 LEFT KNEE PAIN, UNSPECIFIED CHRONICITY: ICD-10-CM

## 2019-03-05 DIAGNOSIS — Z98.890 S/P ACL RECONSTRUCTION: ICD-10-CM

## 2019-03-05 DIAGNOSIS — R60.0 LOCALIZED EDEMA: ICD-10-CM

## 2019-03-05 PROCEDURE — 97530 THERAPEUTIC ACTIVITIES: CPT | Mod: GP | Performed by: PHYSICAL THERAPIST

## 2019-03-05 PROCEDURE — 97110 THERAPEUTIC EXERCISES: CPT | Mod: GP | Performed by: PHYSICAL THERAPIST

## 2019-03-05 NOTE — PROGRESS NOTES
S:  Pt had a two month break and was doing well.  Came back and was weaker.  In this timeframe was doing a lot of crossfit(doing low box jumps, back squats).  Returned to PT and testing values decreased.    O: %  201 80%   Girth 57.5 cm 62 cm  SLR 30/15/15/15    No BFR wall sits 3 x 90 seconds  SL leg press 2.5 plates 3 x 15

## 2019-03-07 ENCOUNTER — THERAPY VISIT (OUTPATIENT)
Dept: PHYSICAL THERAPY | Facility: CLINIC | Age: 44
End: 2019-03-07
Payer: COMMERCIAL

## 2019-03-07 DIAGNOSIS — M25.562 LEFT KNEE PAIN, UNSPECIFIED CHRONICITY: ICD-10-CM

## 2019-03-07 DIAGNOSIS — Z98.890 S/P ACL RECONSTRUCTION: ICD-10-CM

## 2019-03-07 DIAGNOSIS — R60.0 LOCALIZED EDEMA: ICD-10-CM

## 2019-03-07 PROCEDURE — 97110 THERAPEUTIC EXERCISES: CPT | Mod: GP | Performed by: PHYSICAL THERAPIST

## 2019-03-07 PROCEDURE — 97112 NEUROMUSCULAR REEDUCATION: CPT | Mod: GP | Performed by: PHYSICAL THERAPIST

## 2019-03-07 NOTE — PROGRESS NOTES
Date  3/7     Limb Occlusion Pressure  215 Percent (%) Occlusion  80 Training Occlusion Pressure  172 Exercises Performed  Leg press single leg 2.5 30/15/15/15  Single leg knee extension 90-45  blue     Total time under tourniquet  6:30  1 min  6:30     Immediate effects  8/10     9/10 Lingering effects (from previous session)  None   NOTES:

## 2019-03-12 ENCOUNTER — THERAPY VISIT (OUTPATIENT)
Dept: PHYSICAL THERAPY | Facility: CLINIC | Age: 44
End: 2019-03-12
Payer: COMMERCIAL

## 2019-03-12 DIAGNOSIS — M25.562 LEFT KNEE PAIN, UNSPECIFIED CHRONICITY: ICD-10-CM

## 2019-03-12 DIAGNOSIS — R60.0 LOCALIZED EDEMA: ICD-10-CM

## 2019-03-12 DIAGNOSIS — Z98.890 S/P ACL RECONSTRUCTION: ICD-10-CM

## 2019-03-12 PROCEDURE — 97110 THERAPEUTIC EXERCISES: CPT | Mod: GP | Performed by: PHYSICAL THERAPIST

## 2019-03-12 NOTE — PROGRESS NOTES
Date  3/7    Limb Occlusion Pressure  215 Percent (%) Occlusion  80 Training Occlusion Pressure  172 Exercises Performed  Leg press single leg 2.5 30/15/15/15  Single leg knee extension 90-45  blue    Total time under tourniquet  6:30  1 min  6:30    Immediate effects  9/10     9/10 Lingering effects (from previous session)  None   NOTES:

## 2019-03-14 ENCOUNTER — THERAPY VISIT (OUTPATIENT)
Dept: PHYSICAL THERAPY | Facility: CLINIC | Age: 44
End: 2019-03-14
Payer: COMMERCIAL

## 2019-03-14 DIAGNOSIS — Z98.890 S/P ACL RECONSTRUCTION: ICD-10-CM

## 2019-03-14 DIAGNOSIS — R60.0 LOCALIZED EDEMA: ICD-10-CM

## 2019-03-14 DIAGNOSIS — M25.562 LEFT KNEE PAIN, UNSPECIFIED CHRONICITY: ICD-10-CM

## 2019-03-14 PROCEDURE — 97140 MANUAL THERAPY 1/> REGIONS: CPT | Mod: GP | Performed by: PHYSICAL THERAPIST

## 2019-03-14 PROCEDURE — 97110 THERAPEUTIC EXERCISES: CPT | Mod: GP | Performed by: PHYSICAL THERAPIST

## 2019-03-14 PROCEDURE — 97530 THERAPEUTIC ACTIVITIES: CPT | Mod: GP | Performed by: PHYSICAL THERAPIST

## 2019-03-14 NOTE — PROGRESS NOTES
Date  3/14    Limb Occlusion Pressure  215 Percent (%) Occlusion  80 Training Occlusion Pressure  172 Exercises Performed  Leg press single leg 2.5 30/15/15/15  Single leg knee extension 90-45  blue    Total time under tourniquet  6:30  1 min  6:30    Immediate effects  9/10     9/10 Lingering effects (from previous session)  None   NOTES:

## 2019-03-19 ENCOUNTER — THERAPY VISIT (OUTPATIENT)
Dept: PHYSICAL THERAPY | Facility: CLINIC | Age: 44
End: 2019-03-19
Payer: COMMERCIAL

## 2019-03-19 DIAGNOSIS — M25.562 LEFT KNEE PAIN, UNSPECIFIED CHRONICITY: ICD-10-CM

## 2019-03-19 DIAGNOSIS — R60.0 LOCALIZED EDEMA: ICD-10-CM

## 2019-03-19 DIAGNOSIS — Z98.890 S/P ACL RECONSTRUCTION: ICD-10-CM

## 2019-03-19 PROCEDURE — 97110 THERAPEUTIC EXERCISES: CPT | Mod: GP | Performed by: PHYSICAL THERAPIST

## 2019-03-19 PROCEDURE — 97112 NEUROMUSCULAR REEDUCATION: CPT | Mod: GP | Performed by: PHYSICAL THERAPIST

## 2019-03-19 NOTE — PROGRESS NOTES
Date  3/14    Limb Occlusion Pressure  189 Percent (%) Occlusion  80 Training Occlusion Pressure  151 Exercises Performed  Leg press single leg 2.5 30/15/15/15  Single leg knee extension 90-45  blue    Total time under tourniquet  6:30  1 min  6:30    Immediate effects  9/10     9/10 Lingering effects (from previous session)  None   NOTES:

## 2019-03-21 ENCOUNTER — THERAPY VISIT (OUTPATIENT)
Dept: PHYSICAL THERAPY | Facility: CLINIC | Age: 44
End: 2019-03-21
Payer: COMMERCIAL

## 2019-03-21 DIAGNOSIS — Z98.890 S/P ACL RECONSTRUCTION: ICD-10-CM

## 2019-03-21 DIAGNOSIS — M25.562 LEFT KNEE PAIN, UNSPECIFIED CHRONICITY: ICD-10-CM

## 2019-03-21 DIAGNOSIS — R60.0 LOCALIZED EDEMA: ICD-10-CM

## 2019-03-21 PROCEDURE — 97112 NEUROMUSCULAR REEDUCATION: CPT | Mod: GP | Performed by: PHYSICAL THERAPIST

## 2019-03-21 PROCEDURE — 97110 THERAPEUTIC EXERCISES: CPT | Mod: GP | Performed by: PHYSICAL THERAPIST

## 2019-03-21 PROCEDURE — 97530 THERAPEUTIC ACTIVITIES: CPT | Mod: GP | Performed by: PHYSICAL THERAPIST

## 2019-03-21 NOTE — PROGRESS NOTES
Date  3/14    Limb Occlusion Pressure  189 Percent (%) Occlusion  80 Training Occlusion Pressure  151 Exercises Performed  Leg press single leg 3.5 30/15/15/15  Knee ext 2 pound ankle weight 30/15/15/15    Total time under tourniquet  6:30  1 min  6:30    Immediate effects  9/10     9/10 Lingering effects (from previous session)  None   NOTES:

## 2019-03-27 ENCOUNTER — THERAPY VISIT (OUTPATIENT)
Dept: PHYSICAL THERAPY | Facility: CLINIC | Age: 44
End: 2019-03-27
Payer: COMMERCIAL

## 2019-03-27 DIAGNOSIS — M25.562 LEFT KNEE PAIN, UNSPECIFIED CHRONICITY: ICD-10-CM

## 2019-03-27 DIAGNOSIS — Z98.890 S/P ACL RECONSTRUCTION: ICD-10-CM

## 2019-03-27 DIAGNOSIS — R60.0 LOCALIZED EDEMA: ICD-10-CM

## 2019-03-27 PROCEDURE — 97530 THERAPEUTIC ACTIVITIES: CPT | Mod: GP | Performed by: PHYSICAL THERAPIST

## 2019-03-27 PROCEDURE — 97110 THERAPEUTIC EXERCISES: CPT | Mod: GP | Performed by: PHYSICAL THERAPIST

## 2019-03-29 ENCOUNTER — THERAPY VISIT (OUTPATIENT)
Dept: PHYSICAL THERAPY | Facility: CLINIC | Age: 44
End: 2019-03-29
Payer: COMMERCIAL

## 2019-03-29 DIAGNOSIS — M25.562 LEFT KNEE PAIN, UNSPECIFIED CHRONICITY: ICD-10-CM

## 2019-03-29 DIAGNOSIS — Z98.890 S/P ACL RECONSTRUCTION: ICD-10-CM

## 2019-03-29 DIAGNOSIS — R60.0 LOCALIZED EDEMA: ICD-10-CM

## 2019-03-29 PROCEDURE — 97530 THERAPEUTIC ACTIVITIES: CPT | Mod: GP | Performed by: PHYSICAL THERAPIST

## 2019-03-29 PROCEDURE — 97110 THERAPEUTIC EXERCISES: CPT | Mod: GP | Performed by: PHYSICAL THERAPIST

## 2019-03-29 NOTE — PROGRESS NOTES
Date  3/29    Limb Occlusion Pressure  189 Percent (%) Occlusion  80 Training Occlusion Pressure  151 Exercises Performed  Leg press single leg 3.5 30/15/15/15  Single leg knee extension, laq 3 pound ankle weight  30/15/15/15    Total time under tourniquet  6:30  1 min  6:30    Immediate effects  8/10     9/10 Lingering effects (from previous session)  None   NOTES:         Overall stairs are getting better, clicking persists

## 2019-04-01 ENCOUNTER — THERAPY VISIT (OUTPATIENT)
Dept: PHYSICAL THERAPY | Facility: CLINIC | Age: 44
End: 2019-04-01
Payer: COMMERCIAL

## 2019-04-01 DIAGNOSIS — Z98.890 S/P ACL RECONSTRUCTION: ICD-10-CM

## 2019-04-01 DIAGNOSIS — R60.0 LOCALIZED EDEMA: ICD-10-CM

## 2019-04-01 DIAGNOSIS — M25.562 LEFT KNEE PAIN, UNSPECIFIED CHRONICITY: ICD-10-CM

## 2019-04-01 PROCEDURE — 97110 THERAPEUTIC EXERCISES: CPT | Mod: GP | Performed by: PHYSICAL THERAPIST

## 2019-04-01 PROCEDURE — 97530 THERAPEUTIC ACTIVITIES: CPT | Mod: GP | Performed by: PHYSICAL THERAPIST

## 2019-04-03 ENCOUNTER — THERAPY VISIT (OUTPATIENT)
Dept: PHYSICAL THERAPY | Facility: CLINIC | Age: 44
End: 2019-04-03
Payer: COMMERCIAL

## 2019-04-03 DIAGNOSIS — R60.0 LOCALIZED EDEMA: ICD-10-CM

## 2019-04-03 DIAGNOSIS — M25.562 LEFT KNEE PAIN, UNSPECIFIED CHRONICITY: ICD-10-CM

## 2019-04-03 DIAGNOSIS — Z98.890 S/P ACL RECONSTRUCTION: ICD-10-CM

## 2019-04-03 PROCEDURE — 97530 THERAPEUTIC ACTIVITIES: CPT | Mod: GP | Performed by: PHYSICAL THERAPIST

## 2019-04-03 PROCEDURE — 97112 NEUROMUSCULAR REEDUCATION: CPT | Mod: GP | Performed by: PHYSICAL THERAPIST

## 2019-04-03 PROCEDURE — 97110 THERAPEUTIC EXERCISES: CPT | Mod: GP | Performed by: PHYSICAL THERAPIST

## 2019-04-03 NOTE — PROGRESS NOTES
Date  4/3    Limb Occlusion Pressure    225 Percent (%) Occlusion    80 Training Occlusion Pressure    179 Exercises Performed    Leg press single leg 3.5 30/15/15/15    2 leg bridge  30/15/15/15        Total time under tourniquet  6:30      1 min    6:30    Immediate effects    9/10           9/10 Lingering effects (from previous session)  None     :

## 2019-04-07 DIAGNOSIS — Z98.890 S/P LEFT KNEE SURGERY: Primary | ICD-10-CM

## 2019-04-08 ENCOUNTER — OFFICE VISIT (OUTPATIENT)
Dept: ORTHOPEDICS | Facility: CLINIC | Age: 44
End: 2019-04-08
Payer: COMMERCIAL

## 2019-04-08 ENCOUNTER — ANCILLARY PROCEDURE (OUTPATIENT)
Dept: MRI IMAGING | Facility: CLINIC | Age: 44
End: 2019-04-08
Attending: ORTHOPAEDIC SURGERY
Payer: COMMERCIAL

## 2019-04-08 ENCOUNTER — ANCILLARY PROCEDURE (OUTPATIENT)
Dept: GENERAL RADIOLOGY | Facility: CLINIC | Age: 44
End: 2019-04-08
Payer: COMMERCIAL

## 2019-04-08 DIAGNOSIS — Z98.890 S/P LEFT KNEE SURGERY: Primary | ICD-10-CM

## 2019-04-08 DIAGNOSIS — Z98.890 S/P LEFT KNEE SURGERY: ICD-10-CM

## 2019-04-08 ASSESSMENT — ENCOUNTER SYMPTOMS
MUSCLE WEAKNESS: 1
STIFFNESS: 0
BACK PAIN: 0
ARTHRALGIAS: 1
JOINT SWELLING: 0
NECK PAIN: 0
MUSCLE CRAMPS: 0
MYALGIAS: 0

## 2019-04-08 NOTE — PROGRESS NOTES
Orthopedic follow-up clinic  Date of surgery: 4/27/2018 left knee ACL reconstruction with hamstring autograft, all inside repair medial meniscus left knee      HISTORY:  Returns 1 year s/p above.  Initially had been doing well and had returned activities such as working out even at a high intensity level with CrossFit.  Over the last month or 2 she is noticed symptoms of limitation of some of her exercises secondary to weakness and pain in her left knee.  At one point she recalls feeling a pop in her knee and now has lateral sided knee pain which limits her.  She denies swelling.  She had otherwise done well postoperatively and was very happy with the outcome until recently.  Denies medial sided pain.    EXAM:     General: Awake, Alert, and oriented. Articulates and communicates with a normal affect     Left Lower Extremity:    Incisions well healed without evidence of infection    Lachman 1A. Negatie. Pivot Shift    Stable to varus, valgus.    Range of motion 0-130.     Neurovascularly intact    IMAGING:  X-rays left knee from today are reviewed demonstrating no acute osseous abnormalities are degenerative change    ASSESSMENT:  1 year status post left knee anterior cruciate ligament and inside-out repair pair medial meniscus.  Now with new lateral sided knee pain and weakness without instability    PLAN:   - MRI left knee  -Follow-up after MRI discuss results    Reed Sin MD  PGY-5, Orthopaedic Surgery  [phone]    Answers for HPI/ROS submitted by the patient on 4/8/2019   General Symptoms: No  Skin Symptoms: No  HENT Symptoms: No  EYE SYMPTOMS: No  HEART SYMPTOMS: No  LUNG SYMPTOMS: No  INTESTINAL SYMPTOMS: No  URINARY SYMPTOMS: No  GYNECOLOGIC SYMPTOMS: No  BREAST SYMPTOMS: No  SKELETAL SYMPTOMS: Yes  BLOOD SYMPTOMS: No  NERVOUS SYSTEM SYMPTOMS: No  MENTAL HEALTH SYMPTOMS: No  Back pain: No  Muscle aches: No  Neck pain: No  Swollen joints: No  Joint pain: Yes  Bone pain: No  Muscle cramps:  No  Muscle weakness: Yes  Joint stiffness: No  Bone fracture: No

## 2019-04-08 NOTE — LETTER
4/8/2019       RE: Dianelys Wells  [address]  [facility] [address]     Dear Colleague,    Thank you for referring your patient, Dianelys Wells, to the [facility] at [facility]. Please see a copy of my visit note below.    Orthopedic follow-up clinic  Date of surgery: 4/27/2018 left knee ACL reconstruction with hamstring autograft, all inside repair medial meniscus left knee      HISTORY:  Returns 1 year s/p above.  Initially had been doing well and had returned activities such as working out even at a high intensity level with CrossFit.  Over the last month or 2 she is noticed symptoms of limitation of some of her exercises secondary to weakness and pain in her left knee.  At one point she recalls feeling a pop in her knee and now has lateral sided knee pain which limits her.  She denies swelling.  She had otherwise done well postoperatively and was very happy with the outcome until recently.  Denies medial sided pain.    EXAM:     General: Awake, Alert, and oriented. Articulates and communicates with a normal affect     Left Lower Extremity:    Incisions well healed without evidence of infection    Lachman 1A. Negatie. Pivot Shift    Stable to varus, valgus.    Range of motion 0-130.     Neurovascularly intact    IMAGING:  X-rays left knee from today are reviewed demonstrating no acute osseous abnormalities are degenerative change    ASSESSMENT:  1 year status post left knee anterior cruciate ligament and inside-out repair pair medial meniscus.  Now with new lateral sided knee pain and weakness without instability    PLAN:   - MRI left knee  -Follow-up after MRI discuss results    Reed Sin MD  PGY-5, Orthopaedic Surgery  [phone]    Answers for HPI/ROS submitted by the patient on 4/8/2019   General Symptoms: No  Skin Symptoms: No  HENT Symptoms: No  EYE SYMPTOMS: No  HEART SYMPTOMS: No  LUNG SYMPTOMS: No  INTESTINAL SYMPTOMS:  No  URINARY SYMPTOMS: No  GYNECOLOGIC SYMPTOMS: No  BREAST SYMPTOMS: No  SKELETAL SYMPTOMS: Yes  BLOOD SYMPTOMS: No  NERVOUS SYSTEM SYMPTOMS: No  MENTAL HEALTH SYMPTOMS: No  Back pain: No  Muscle aches: No  Neck pain: No  Swollen joints: No  Joint pain: Yes  Bone pain: No  Muscle cramps: No  Muscle weakness: Yes  Joint stiffness: No  Bone fracture: No      Patient seen and examined with the resident.     Assesment: 1 year following hamstring autograft ACL reconstruction hamstring autograft all inside medial meniscus repair    Plan: Had done very well following surgery though is noted some weakness about her knee.  Wanted to get things checked out.  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    We will plan to get an MRI of her knee and I will plan to see her back afterwards.  Her exam shows that her ACL graft is intact.    I agree with history, physical and imaging as well as the assessment and plan as detailed by Dr. Sin.       Again, thank you for allowing me to participate in the care of your patient.      Sincerely,    Jaun Biggs MD

## 2019-04-08 NOTE — PROGRESS NOTES
Patient seen and examined with the resident.     Assesment: 1 year following hamstring autograft ACL reconstruction hamstring autograft all inside medial meniscus repair    Plan: Had done very well following surgery though is noted some weakness about her knee.  Wanted to get things checked out.  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    We will plan to get an MRI of her knee and I will plan to see her back afterwards.  Her exam shows that her ACL graft is intact.    I agree with history, physical and imaging as well as the assessment and plan as detailed by Dr. Sin.

## 2019-04-08 NOTE — NURSING NOTE
Reason For Visit:   Chief Complaint   Patient presents with     Surgical Followup     DOS 4/27/18 Examination Under Anesthesia Left Knee, Left Anterior Cruciate Ligament Reconstruction, Hamstring Autograft, Meniscus Repair     In January, she increased her activity and started going to the gym 5-6x per week. By the third week, she felt like her left knee was getting weaker. One day at home, she lifted her leg and felt a pop in her knee. She has been going back to physical therapy but still feels pain and weakness.     Date of surgery: 4/27/18  Type of surgery:   PROCEDURE:  1. Examination under anesthesia left knee  2. Left knee arthroscopy  3. Left knee ACL reconstruction hamstring autograft  4. All inside repair of the medial meniscus left knee    Smoker: No  Request smoking cessation information: No    Pain Assessment  Patient Currently in Pain: Yes  Primary Pain Location: Knee    There were no vitals taken for this visit.         Allergies   Allergen Reactions     Sulfa Drugs        Current Outpatient Medications   Medication     BuPROPion HCl (WELLBUTRIN PO)     medroxyPROGESTERone (DEPO-PROVERA) 150 MG/ML injection     Naproxen Sodium (ALEVE PO)     acetaminophen (TYLENOL) 325 MG tablet     aspirin 325 MG EC tablet     hydrOXYzine (ATARAX) 25 MG tablet     ondansetron (ZOFRAN-ODT) 4 MG ODT tab     oxyCODONE IR (ROXICODONE) 5 MG tablet     senna-docusate (SENOKOT-S;PERICOLACE) 8.6-50 MG per tablet     No current facility-administered medications for this visit.          Annemarie Prabhakar, ATC

## 2019-04-09 ENCOUNTER — THERAPY VISIT (OUTPATIENT)
Dept: PHYSICAL THERAPY | Facility: CLINIC | Age: 44
End: 2019-04-09
Payer: COMMERCIAL

## 2019-04-09 DIAGNOSIS — M25.562 LEFT KNEE PAIN, UNSPECIFIED CHRONICITY: ICD-10-CM

## 2019-04-09 DIAGNOSIS — Z98.890 S/P ACL RECONSTRUCTION: ICD-10-CM

## 2019-04-09 DIAGNOSIS — R60.0 LOCALIZED EDEMA: ICD-10-CM

## 2019-04-09 PROCEDURE — 97530 THERAPEUTIC ACTIVITIES: CPT | Mod: GP | Performed by: PHYSICAL THERAPIST

## 2019-04-09 PROCEDURE — 97110 THERAPEUTIC EXERCISES: CPT | Mod: GP | Performed by: PHYSICAL THERAPIST

## 2019-04-09 PROCEDURE — 97112 NEUROMUSCULAR REEDUCATION: CPT | Mod: GP | Performed by: PHYSICAL THERAPIST

## 2019-04-09 NOTE — PROGRESS NOTES
Date  4/9    Limb Occlusion Pressure     256 Percent (%) Occlusion     80 Training Occlusion Pressure     205 Exercises Performed     Leg press single leg 3.5 30/15/15/15     2 leg bridge  30/15/15/15          Total time under tourniquet  6:30        1 min     6:30    Immediate effects     9/10              9/10 Lingering effects (from previous session)  None

## 2019-04-15 ENCOUNTER — OFFICE VISIT (OUTPATIENT)
Dept: ORTHOPEDICS | Facility: CLINIC | Age: 44
End: 2019-04-15
Payer: COMMERCIAL

## 2019-04-15 DIAGNOSIS — Z98.890 S/P LEFT KNEE SURGERY: Primary | ICD-10-CM

## 2019-04-15 ASSESSMENT — ENCOUNTER SYMPTOMS
ARTHRALGIAS: 1
STIFFNESS: 0
JOINT SWELLING: 0
MYALGIAS: 0
BACK PAIN: 0
MUSCLE WEAKNESS: 1
NECK PAIN: 0
MUSCLE CRAMPS: 0

## 2019-04-15 NOTE — LETTER
4/15/2019       RE: Dianelys Wells  [address]  [facility] [address]     Dear Colleague,    Thank you for referring your patient, Dianelys Wells, to the [facility]. Please see a copy of my visit note below.    Orthopedic follow-up clinic  Date of surgery: 4/27/2018 left knee ACL reconstruction with hamstring autograft, all inside repair medial meniscus left knee      HISTORY:  F/u to discuss MRI results. Continues to have episodic laterally based pain. Pain not in knee cap area specifically, more lateral in nature along joint line. No swelling. No changes in symptoms.     EXAM:     General: Awake, Alert, and oriented. Articulates and communicates with a normal affect     Left Lower Extremity:    Incisions well healed without evidence of infection    Lachman 1A. Negatie. Pivot Shift    Stable to varus, valgus.    Range of motion 0-130.     Neurovascularly intact    IMAGING:  MRI L Knee, agree w/ radiology findings  1. New, when compared to the prior examination, cartilage delamination  along the entire central sulcus of the trochlea and full-thickness  cartilage fissuring and early delamination around the median ridge of  the patella.  2. Intact ACL reconstruction utilizing a hamstring autograft. The  donor site appears largely unremarkable with slightly reduced diameter  of the gracilis and semitendinosus tendons.  3. Very similar appearance of the body and posterior horn of the  medial meniscus that had been repaired. No displaced fragments.  Persistent horizontal oblique increased signal within the meniscus  without definite evidence of fluid signal or progression at the repair  site. Given the stability of the appearance of the medial meniscus  with an intact posterior root attachment findings are likely sequela  of repair. Superficial fraying of articular cartilage, no  full-thickness defects.  4. Intact lateral meniscus and  lateral articular cartilaginous  Surfaces.    ASSESSMENT:  1 year status post left knee anterior cruciate ligament and inside-out repair pair medial meniscus.  Now with lateral sided episodic pain with deep flexion exercise type activities and new MRI demonstrating some interval increase in trochlear cartilage wear of uncertain clinical significance.    We long discussion today with the patient regarding management options.  It is uncertain whether her symptoms are result of her trochlear wear or separate in nature.  What is certain is that her ACL graft is intact and her knee is stable on exam.  We discussed management which could include observation, anti-inflammatories, steroid injection, and has a last option some sort of surgical intervention.  At this time the patient and we agree that surgery is probably not a first-line intervention.  Nonoperative measures options would be preferred.  We discussed at length exercises and therapies that can be pursued to help with overall knee health.  We also discussed that if the patient would like we could offer nonsteroidal or a steroid injection at any juncture in the future.  Patient understands and will call if symptoms persist or worsen and if she would like to pursue any of these options.      PLAN:   -Continue with home therapy and exercise  -Follow-up as needed.  Patient would like can provide nonsteroidal prescription for corticosteroid injection    Reed Sin MD  PGY-5, Orthopaedic Surgery  [phone]      Answers for HPI/ROS submitted by the patient on 4/15/2019   General Symptoms: No  Skin Symptoms: No  HENT Symptoms: No  EYE SYMPTOMS: No  HEART SYMPTOMS: No  LUNG SYMPTOMS: No  INTESTINAL SYMPTOMS: No  URINARY SYMPTOMS: No  GYNECOLOGIC SYMPTOMS: No  BREAST SYMPTOMS: No  SKELETAL SYMPTOMS: Yes  BLOOD SYMPTOMS: No  NERVOUS SYSTEM SYMPTOMS: No  MENTAL HEALTH SYMPTOMS: No  Back pain: No  Muscle aches: No  Neck pain: No  Swollen joints: No  Joint pain:  Yes  Bone pain: No  Muscle cramps: No  Muscle weakness: Yes  Joint stiffness: No  Bone fracture: No      Patient seen and examined with the resident.     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  She describes most of her symptoms is pain along the posterior lateral aspect of her knee.  MRI confirms intact cartilage and meniscus in this area.    The biggest finding in this MRI is progressed trochlear chondral wear.  The patient is done a lot of reading regarding autologous chondrocyte implantation and cartilage reconstruction.    I long discussion with the patient regarding maximize her nonsurgical management.  Exhausting all nonsurgical options first.  And that there is no role for prophylactic or preventative cartilage surgery.  I have offered her a prescription strength anti-inflammatories as well as a corticosteroid injection.  She is declined both.  She would like to work with activity modification and try over-the-counter NSAIDs.  If this does not provide lasting benefit she will contact my clinic.    I agree with history, physical and imaging as well as the assessment and plan as detailed by Dr. Sin.       Again, thank you for allowing me to participate in the care of your patient.      Sincerely,    Jaun Biggs MD

## 2019-04-15 NOTE — PROGRESS NOTES
Patient seen and examined with the resident.     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  She describes most of her symptoms is pain along the posterior lateral aspect of her knee.  MRI confirms intact cartilage and meniscus in this area.    The biggest finding in this MRI is progressed trochlear chondral wear.  The patient is done a lot of reading regarding autologous chondrocyte implantation and cartilage reconstruction.    I long discussion with the patient regarding maximize her nonsurgical management.  Exhausting all nonsurgical options first.  And that there is no role for prophylactic or preventative cartilage surgery.  I have offered her a prescription strength anti-inflammatories as well as a corticosteroid injection.  She is declined both.  She would like to work with activity modification and try over-the-counter NSAIDs.  If this does not provide lasting benefit she will contact my clinic.    I agree with history, physical and imaging as well as the assessment and plan as detailed by Dr. Sin.

## 2019-04-15 NOTE — PROGRESS NOTES
Orthopedic follow-up clinic  Date of surgery: 4/27/2018 left knee ACL reconstruction with hamstring autograft, all inside repair medial meniscus left knee      HISTORY:  F/u to discuss MRI results. Continues to have episodic laterally based pain. Pain not in knee cap area specifically, more lateral in nature along joint line. No swelling. No changes in symptoms.     EXAM:     General: Awake, Alert, and oriented. Articulates and communicates with a normal affect     Left Lower Extremity:    Incisions well healed without evidence of infection    Lachman 1A. Negatie. Pivot Shift    Stable to varus, valgus.    Range of motion 0-130.     Neurovascularly intact    IMAGING:  MRI L Knee, agree w/ radiology findings  1. New, when compared to the prior examination, cartilage delamination  along the entire central sulcus of the trochlea and full-thickness  cartilage fissuring and early delamination around the median ridge of  the patella.  2. Intact ACL reconstruction utilizing a hamstring autograft. The  donor site appears largely unremarkable with slightly reduced diameter  of the gracilis and semitendinosus tendons.  3. Very similar appearance of the body and posterior horn of the  medial meniscus that had been repaired. No displaced fragments.  Persistent horizontal oblique increased signal within the meniscus  without definite evidence of fluid signal or progression at the repair  site. Given the stability of the appearance of the medial meniscus  with an intact posterior root attachment findings are likely sequela  of repair. Superficial fraying of articular cartilage, no  full-thickness defects.  4. Intact lateral meniscus and lateral articular cartilaginous  Surfaces.    ASSESSMENT:  1 year status post left knee anterior cruciate ligament and inside-out repair pair medial meniscus.  Now with lateral sided episodic pain with deep flexion exercise type activities and new MRI demonstrating some interval increase in  trochlear cartilage wear of uncertain clinical significance.    We long discussion today with the patient regarding management options.  It is uncertain whether her symptoms are result of her trochlear wear or separate in nature.  What is certain is that her ACL graft is intact and her knee is stable on exam.  We discussed management which could include observation, anti-inflammatories, steroid injection, and has a last option some sort of surgical intervention.  At this time the patient and we agree that surgery is probably not a first-line intervention.  Nonoperative measures options would be preferred.  We discussed at length exercises and therapies that can be pursued to help with overall knee health.  We also discussed that if the patient would like we could offer nonsteroidal or a steroid injection at any juncture in the future.  Patient understands and will call if symptoms persist or worsen and if she would like to pursue any of these options.      PLAN:   -Continue with home therapy and exercise  -Follow-up as needed.  Patient would like can provide nonsteroidal prescription for corticosteroid injection    Reed Sin MD  PGY-5, Orthopaedic Surgery  [phone]      Answers for HPI/ROS submitted by the patient on 4/15/2019   General Symptoms: No  Skin Symptoms: No  HENT Symptoms: No  EYE SYMPTOMS: No  HEART SYMPTOMS: No  LUNG SYMPTOMS: No  INTESTINAL SYMPTOMS: No  URINARY SYMPTOMS: No  GYNECOLOGIC SYMPTOMS: No  BREAST SYMPTOMS: No  SKELETAL SYMPTOMS: Yes  BLOOD SYMPTOMS: No  NERVOUS SYSTEM SYMPTOMS: No  MENTAL HEALTH SYMPTOMS: No  Back pain: No  Muscle aches: No  Neck pain: No  Swollen joints: No  Joint pain: Yes  Bone pain: No  Muscle cramps: No  Muscle weakness: Yes  Joint stiffness: No  Bone fracture: No

## 2019-07-12 ENCOUNTER — TELEPHONE (OUTPATIENT)
Dept: ORTHOPEDICS | Facility: CLINIC | Age: 44
End: 2019-07-12

## 2019-07-12 NOTE — TELEPHONE ENCOUNTER
[facility]    Phone Message    May a detailed message be left on voicemail: yes    Reason for Call: Other: Dianelys got scheduled to see Dr. Biggs on Monday 7/15.   She said that Dr Biggs mentioned that he could perscribe an anxiety med for her to take before injection.  She is hoping to get a rx today.   please call her back to discuss.      Action Taken: Message routed to:  [facility] ([facility] ortho

## 2019-07-12 NOTE — TELEPHONE ENCOUNTER
Pt wants an antianxiety medication prior to getting a knee injection. Last clinic note says nothing about this information.       Called pt back and informed her message can be routed to his team regarding this issue.     Lorraine

## 2019-07-15 ENCOUNTER — OFFICE VISIT (OUTPATIENT)
Dept: ORTHOPEDICS | Facility: CLINIC | Age: 44
End: 2019-07-15
Payer: COMMERCIAL

## 2019-07-15 DIAGNOSIS — Z98.890 S/P LEFT KNEE SURGERY: Primary | ICD-10-CM

## 2019-07-15 RX ORDER — TRIAMCINOLONE ACETONIDE 40 MG/ML
40 INJECTION, SUSPENSION INTRA-ARTICULAR; INTRAMUSCULAR
Status: SHIPPED | OUTPATIENT
Start: 2019-07-15

## 2019-07-15 RX ORDER — LIDOCAINE HYDROCHLORIDE 5 MG/ML
8 INJECTION, SOLUTION INFILTRATION; INTRAVENOUS
Status: SHIPPED | OUTPATIENT
Start: 2019-07-15

## 2019-07-15 RX ADMIN — TRIAMCINOLONE ACETONIDE 40 MG: 40 INJECTION, SUSPENSION INTRA-ARTICULAR; INTRAMUSCULAR at 11:22

## 2019-07-15 RX ADMIN — LIDOCAINE HYDROCHLORIDE 8 ML: 5 INJECTION, SOLUTION INFILTRATION; INTRAVENOUS at 11:22

## 2019-07-15 NOTE — TELEPHONE ENCOUNTER
Received message on Monday 7/15; patient is to be seen in Dr. Biggs's clinic today; may discuss at her appointment as Dr. Biggs's last clinic note states nothing about anti-anxiety medication.

## 2019-07-15 NOTE — NURSING NOTE
[facility] [address] [address]  Dept: [phone]  ______________________________________________________________________________    Patient: Dianelys Wells   : 1975   MRN: [id number]   July 15, 2019    INVASIVE PROCEDURE SAFETY CHECKLIST    Date: 7/15/19   Procedure: Left knee CSI with Kenalog  Patient Name: Dianelys Wells  MRN: [id number]  YOB: 1975    Action: Complete sections as appropriate. Any discrepancy results in a HARD COPY until resolved.     PRE PROCEDURE:  Patient ID verified with 2 identifiers (name and  or MRN): Yes  Procedure and site verified with patient/designee (when able): Yes  Accurate consent documentation in medical record: Yes  H&P (or appropriate assessment) documented in medical record: Yes  H&P must be up to 20 days prior to procedure and updates within 24 hours of procedure as applicable: NA  Relevant diagnostic and radiology test results appropriately labeled and displayed as applicable: Yes  Procedure site(s) marked with provider initials: NA    TIMEOUT:  Time-Out performed immediately prior to starting procedure, including verbal and active participation of all team members addressing the following:Yes  * Correct patient identify  * Confirmed that the correct side and site are marked  * An accurate procedure consent form  * Agreement on the procedure to be done  * Correct patient position  * Relevant images and results are properly labeled and appropriately displayed  * The need to administer antibiotics or fluids for irrigation purposes during the procedure as applicable   * Safety precautions based on patient history or medication use    DURING PROCEDURE: Verification of correct person, site, and procedures any time the responsibility for care of the patient is transferred to another member of the care team.       Prior to injection, verified patient identity using patient's name and date of  birth.  Due to injection administration, patient instructed to remain in clinic for 15 minutes  afterwards, and to report any adverse reaction to me immediately.    Joint injection was performed.      Drug Amount Wasted:  42 of 50mg Lidocaine wasted  Vial/Syringe: Single dose vial  Expiration Date:  11/1/22      Annemarie Prabhakar, [facility]  July 15, 2019

## 2019-07-15 NOTE — PROGRESS NOTES
DIAGNOSIS:   1. Trochlear chondral defect  2. ACL tear s/p reconstruction, hamstring graft stable  3. Medial mensicus tear s/p reconstruction    PROCEDURES:  1. L knee ACL reconstruction with hamstring autograft  2. All inside L medial meniscus repair    Date of Surgery: 4/27/2019    HISTORY:  Dianelys Wells is a 43-year old woman with a history of ACL tear who underwent the above procedures approximately 15 months ago. At her 1 year follow-up visit, she reported weakness and lateral sided knee pain, limiting her participation in [employer].  MRI showed trochlear chondral wear, and we spoke about managing her knee pain non-operatively before pursuing cartilage reconstruction options. She initially managed her pain with activity modification and over-the-counter NSAIDS, but the pain has persisted so she presents today for corticosteroid injection. She denies swelling or redness in her knee.     EXAM:     General: Awake, Alert, and oriented. Articulates and communicates with a normal affect     Left Lower Extremity    Lachman 1A. Negative Pivot Shift    Stable to varus, valgus stress    Range of motion 0-130.     Neurovascularly intact    IMAGING:  MRI from April 2019:   1. New, when compared to the prior examination, cartilage delamination  along the entire central sulcus of the trochlea and full-thickness  cartilage fissuring and early delamination around the median ridge of  the patella.  2. Intact ACL reconstruction utilizing a hamstring autograft. The  donor site appears largely unremarkable with slightly reduced diameter  of the gracilis and semitendinosus tendons.  3. Very similar appearance of the body and posterior horn of the  medial meniscus that had been repaired. No displaced fragments.  Persistent horizontal oblique increased signal within the meniscus  without definite evidence of fluid signal or progression at the repair  site. Given the stability of the appearance of the medial meniscus  with an intact  posterior root attachment findings are likely sequela  of repair. Superficial fraying of articular cartilage, no  full-thickness defects.  4. Intact lateral meniscus and lateral articular cartilaginous surfaces    ASSESSMENT:  1. Trochlear Chondral Defect  2. 15-months status-post ACL reconstruction and medial meniscal repair     PLAN:   - Again, we discussed her chondral defect and that non-operative measures are the preferred intervention at this time.   - Corticosteroid injection offered today, patient accepted  - Continue home therapy and exercise    After written informed consent obtained and signed, after sufficient prepping and sterile technique, 40 mg of kenalog and , 8 cc of 1% lidocaine were injected without complication into the left knee. The patient tolerated the injection well and a sterile dressing was applied.       Large Joint Injection/Arthocentesis: L knee joint  Date/Time: 7/15/2019 11:22 AM  Performed by: Jaun Biggs MD  Authorized by: Jaun Biggs MD     Needle Size:  22 G  Guidance: landmark guided    Approach:  Anterolateral  Location:  Knee      Medications:  40 mg triamcinolone 40 MG/ML; 8 mL lidocaine (PF) 0.5 %  Outcome:  Tolerated well, no immediate complications  Procedure discussed: discussed risks, benefits, and alternatives    Consent Given by:  Patient  Timeout: timeout called immediately prior to procedure    Prep: patient was prepped and draped in usual sterile fashion     Scribed by Annemarie Prabhakar ATC for Dr. Biggs on 7/15/19 at 11:20AM, based on the provider s statements to me.

## 2019-07-15 NOTE — LETTER
7/15/2019       RE: Dianelys Wells  [address]  [facility] [address]     Dear Colleague,    Thank you for referring your patient, Dianelys Wells, to the [facility] at [facility]. Please see a copy of my visit note below.    DIAGNOSIS:   1. Trochlear chondral defect  2. ACL tear s/p reconstruction, hamstring graft stable  3. Medial mensicus tear s/p reconstruction    PROCEDURES:  1. L knee ACL reconstruction with hamstring autograft  2. All inside L medial meniscus repair    Date of Surgery: 4/27/2019    HISTORY:  Dianelys Wells is a 43-year old woman with a history of ACL tear who underwent the above procedures approximately 15 months ago. At her 1 year follow-up visit, she reported weakness and lateral sided knee pain, limiting her participation in [employer].  MRI showed trochlear chondral wear, and we spoke about managing her knee pain non-operatively before pursuing cartilage reconstruction options. She initially managed her pain with activity modification and over-the-counter NSAIDS, but the pain has persisted so she presents today for corticosteroid injection. She denies swelling or redness in her knee.     EXAM:     General: Awake, Alert, and oriented. Articulates and communicates with a normal affect     Left Lower Extremity    Lachman 1A. Negative Pivot Shift    Stable to varus, valgus stress    Range of motion 0-130.     Neurovascularly intact    IMAGING:  MRI from April 2019:   1. New, when compared to the prior examination, cartilage delamination  along the entire central sulcus of the trochlea and full-thickness  cartilage fissuring and early delamination around the median ridge of  the patella.  2. Intact ACL reconstruction utilizing a hamstring autograft. The  donor site appears largely unremarkable with slightly reduced diameter  of the gracilis and semitendinosus tendons.  3. Very similar appearance of the body and posterior horn of  the  medial meniscus that had been repaired. No displaced fragments.  Persistent horizontal oblique increased signal within the meniscus  without definite evidence of fluid signal or progression at the repair  site. Given the stability of the appearance of the medial meniscus  with an intact posterior root attachment findings are likely sequela  of repair. Superficial fraying of articular cartilage, no  full-thickness defects.  4. Intact lateral meniscus and lateral articular cartilaginous surfaces    ASSESSMENT:  1. Trochlear Chondral Defect  2. 15-months status-post ACL reconstruction and medial meniscal repair     PLAN:   - Again, we discussed her chondral defect and that non-operative measures are the preferred intervention at this time.   - Corticosteroid injection offered today, patient accepted  - Continue home therapy and exercise    After written informed consent obtained and signed, after sufficient prepping and sterile technique, 40 mg of kenalog and , 8 cc of 1% lidocaine were injected without complication into the left knee. The patient tolerated the injection well and a sterile dressing was applied.       Large Joint Injection/Arthocentesis: L knee joint  Date/Time: 7/15/2019 11:22 AM  Performed by: Jaun Biggs MD  Authorized by: Jaun Biggs MD     Needle Size:  22 G  Guidance: landmark guided    Approach:  Anterolateral  Location:  Knee      Medications:  40 mg triamcinolone 40 MG/ML; 8 mL lidocaine (PF) 0.5 %  Outcome:  Tolerated well, no immediate complications  Procedure discussed: discussed risks, benefits, and alternatives    Consent Given by:  Patient  Timeout: timeout called immediately prior to procedure    Prep: patient was prepped and draped in usual sterile fashion     Scribed by Annemarie Prabhakar ATC for Dr. Biggs on 7/15/19 at 11:20AM, based on the provider s statements to me.            Again, thank you for allowing me to participate in the care of  your patient.      Sincerely,    Jaun Biggs MD

## 2019-08-30 PROBLEM — Z98.890 S/P ACL RECONSTRUCTION: Status: RESOLVED | Noted: 2018-05-03 | Resolved: 2019-08-30

## 2019-08-30 PROBLEM — M25.562 KNEE PAIN, LEFT: Status: RESOLVED | Noted: 2018-05-03 | Resolved: 2019-08-30

## 2019-08-30 PROBLEM — R60.9 EDEMA: Status: RESOLVED | Noted: 2018-05-03 | Resolved: 2019-08-30

## 2019-11-06 ENCOUNTER — HEALTH MAINTENANCE LETTER (OUTPATIENT)
Age: 44
End: 2019-11-06

## 2020-04-01 ENCOUNTER — OFFICE VISIT - HEALTHEAST (OUTPATIENT)
Dept: FAMILY MEDICINE | Facility: CLINIC | Age: 45
End: 2020-04-01

## 2020-04-01 DIAGNOSIS — R05.9 COUGH: ICD-10-CM

## 2020-04-01 DIAGNOSIS — Z20.822 SUSPECTED COVID-19 VIRUS INFECTION: ICD-10-CM

## 2020-11-29 ENCOUNTER — HEALTH MAINTENANCE LETTER (OUTPATIENT)
Age: 45
End: 2020-11-29

## 2021-06-07 NOTE — PROGRESS NOTES
SUBJECTIVE:   Here for curbside evaluation for COVID-19 referred through [facility].   Confirmed [facility] badmanfred with name and date of birth for specimen collection.   Patient reports no new symptoms.      OBJECTIVE:   In no apparent distress  Eyes appear normal  Mucous membranes moist  Non diaphoretic   No increased work of breathing   Mental status appears normal/affect normal         Suspected Covid-19 Virus Infection  (primary encounter diagnosis)     Cough  Over the counter meds and isolation discussed until results in from [facility] team.  Brief education provided.   Time of visit was 15 minutes more than half in coordination of care and counseling regarding COVID and self-isolation.

## 2021-09-19 ENCOUNTER — HEALTH MAINTENANCE LETTER (OUTPATIENT)
Age: 46
End: 2021-09-19

## 2022-01-09 ENCOUNTER — HEALTH MAINTENANCE LETTER (OUTPATIENT)
Age: 47
End: 2022-01-09

## 2022-11-21 ENCOUNTER — HEALTH MAINTENANCE LETTER (OUTPATIENT)
Age: 47
End: 2022-11-21

## 2023-04-16 ENCOUNTER — HEALTH MAINTENANCE LETTER (OUTPATIENT)
Age: 48
End: 2023-04-16

## 2023-11-26 ENCOUNTER — HEALTH MAINTENANCE LETTER (OUTPATIENT)
Age: 48
End: 2023-11-26

## 2024-03-25 NOTE — OP NOTE
PREOPERATIVE DIAGNOSIS:   1. Left anterior cruciate ligament tear  2. Vertical tear of the undersurface the medial meniscus    POSTOPERATIVE DIAGNOSIS:  1. Left anterior cruciate ligament tear, grade 3  2. Repairable tear of the left medial meniscus    PROCEDURE:  1. Examination under anesthesia left knee  2. Left knee arthroscopy  3. Left knee ACL reconstruction hamstring autograft  4. All inside repair of the medial meniscus left knee    DATE OF SURGERY: 4/27/2018    SURGEON: Jaun Biggs MD    ASSISTANT: Ezra Sheppard PA-C.  The assistance of Mr. Sheppard was necessary for patient positioning graft preparation and graft passage    RESIDENT OR FELLOW: None available    OPERATIVE INDICATIONS: Dianelys Wells is a pleasant 42 year old female who I saw through my orthopedic clinic with a history, physical, imaging consistent with ACL tear, tear of medial meniscus.  I reviewed with the patient the risks, benefits, complications, techniques and alternatives to surgery.  We reviewed the expected course of recovery and the potential expected outcomes.  The patient understood both the risks and benefits and desired to proceed despite the risks.    OPERATIVE DETAILS: In the preoperative area the patient's informed consent was reviewed and they desired to proceed.  The left leg was marked and the patient was in agreement.  The patient was taken to the operating room where a timeout was performed and all parties were in agreement.  Preoperative antibiotics were given within 1 hour of the time of incision.  The patient was placed in the supine position and surrendered to LMA anesthesia.  No tourniquet was applied.  Egg crate was placed beneath the well leg and a side post was utilized.  The operative leg was prepped and draped in the usual sterile fashion.     Examination under anesthesia: Range of motion 0-145, 1 quadrant medial and 2 quadrant lateral translation of the patella, stable to varus and valgus stress  testing, stable posterior drawer testing, 2+ anterior drawer testing, 2B Lachman, 1+ pivot shift    Graft harvest and preparation: A 4 cm incision was made over the anterior medial tibia.  It was carried down through the skin and subcutaneous tissues and meticulous hemostasis was insured.  The fascia was opened with an apex anterior-superior-based incision and the sartorius fascia was identified.  A marking suture was placed at this top corner and the fascia was reflected exposing the semi-tendinosis.  A right angle snap was used to free the semi-tendinosis from the overlying fascia and the adhesions were removed sharply.  Once there was adequate excursion of the tendon across the tibial tubercle, and no tenting of the gastroc fascia a small tendon stripper was introduced and the tendon was harvested free.    It was taken to the back table where a graft link technology was employed.  The graft was quadrupled, running locking fiber loop was placed on each tail and the folds of the graft were doubled over the [employer].  Circumferential sutures were placed at 1 and 2 cm.  The final graft dimensions measured 70 mm of length by 9.5 mm.  The graft was tensioned at 20 pounds for 20 minutes to remove any creep.      Anterior medial and anterior lateral arthroscopic portals were created and a diagnostic arthroscopy was performed with the following findings: The medial patella facet, lateral patella facet, central ridge of the patella showed normal cartilage.  The trochlear cartilage was normal.  The medial femoral condyle showed normal cartilage and medial tibial plateau showed normal cartilage. The lateral femoral condyle showed normal cartilage and lateral tibial plateau showed normal. The Medial meniscus vertical tear of the undersurface the medial meniscus.  Overall the meniscus is stable.  Though I felt it was repairable.  And Lateral Meniscus intact.  There was a grade 3 rupture of the anterior cruciate  ligament with positive empty wall sign.  The PCL was intact.  There was no opening to varus and valgus stress testing in either the lateral or medial compartments, respectively.    At this time a rasp was introduced rasping along the meniscosynovial junction was performed.  An Arthrex meniscus cinch was then introduced through the medial portal and placed along the meniscus providing excellent compression of the meniscus to the periphery.  Probing showed good stability.  One suture was all that was needed to conform ultimate stability to the meniscus.  A knot pusher was introduced and the knot was cut short.    A debridement of the nonfunctioning ACL was then performed until we could visualize the anatomic insertion sites of the ACL on both the femoral and the tibial origin.  Remnant preservation was pursued in the tibial side and the tibial tunnel was centered midway between the medial and lateral tibial spines in line with the posterior aspect of the anterior horn of the lateral meniscus.    The arthroscope was placed into the medial portal and a 6-9 guide was placed into the lateral portal we selected our position along the lateral femoral wall.  The osseous length measured 35 mm.  A 9.5 mm flip cutter was then introduced through the lateral wall and a 25 mm socket was reamed.  The flip cutter was placed into the straight position and was replaced with a fiber loop suture.  Arthroscopic visualization showed excellent position of the femoral tunnel.  Excess bone from the reamings was then removed arthroscopically.    The arthroscope was then returned to the lateral portal, a tip to tip guide was introduced.  Our osseous length measured 40 mm.  The flip cutter was placed and a 30 mm socket was reamed.  The flip cutter was then returned to the straight position and a fiber loop passing suture was placed.    The medial portal was enlarged.  We confirm that there were no soft tissue bridges.  The graft was brought up  "onto the field reduced to the medial portal.  The cortical button was deployed over the lateral cortex wire was confirmed on C arm intraoperative imaging to be in good position.  Approximately 20 mm of graft was then reduced into the femoral tunnel.  The remainder of the graft was reduced in the tibia.  We then \"balanced\" the graft within the knee joint with 20 mm of graft in the femoral side, 20 mm of graft in the tibial side.  Tensioning and retensioning was then performed in full extension.  Final knot-tying was performed on the tibia and the femoral side.  Examination showed Lachman of 0, no pivot shift. Final arthroscopic images showed good position of the graft, good tension to probing, clearance along the roof of the intercondylar notch in terminal extension clearance along the lateral wall and PCL in flexion.    Copious irrigation was performed an a layered closure was initiated, sterile dressings were applied and the patient was transferred to the recovery room in stable condition with stable vital signs.    ESTIMATED BLOOD LOSS: 20 mL.    TOURNIQUET TIME: No tourniquet was placed.    COMPLICATIONS: None apparent.    DRAINS: None.    SPECIMENS: None.     POSTOPERATIVE PLAN:  1. Weightbearing as tolerated  2. Knee immobilizer ×1 week at 1 week wean when able  3. Wean from crutches when able  4. No motion restrictions  5. Goal will be to walk to my clinic at 6 weeks no brace no crutches  6. Aspirin 325 mg for DVT prophylaxis ×4 weeks  7. No running until 3 months  8. No sports until 6 months  9. Shower on day 3    " Patient will ambulate 50ft with RW with CGA by discharge.

## 2024-06-22 ENCOUNTER — HEALTH MAINTENANCE LETTER (OUTPATIENT)
Age: 49
End: 2024-06-22

## (undated) DEVICE — PAD ARMBOARD FOAM EGGCRATE COVIDEN 3114367

## (undated) DEVICE — IMM COOL TROM ADVANCE WITH HINGE 11-9114-9

## (undated) DEVICE — PACK ARTHROSCOPY CUSTOM ASC

## (undated) DEVICE — ESU GROUND PAD ADULT W/CORD E7507

## (undated) DEVICE — Device

## (undated) DEVICE — PIN ARTHREX FLIPCUTTER II  SHORT 9.5MM AR-1204AS-95

## (undated) DEVICE — SOL NACL 0.9% IRRIG 500ML BOTTLE 2F7123

## (undated) DEVICE — SU MONOCRYL 3-0 PS-1 27" Y936H

## (undated) DEVICE — ESU PENCIL SMOKE EVAC W/ROCKER SWITCH 0703-047-000

## (undated) DEVICE — SUCTION MANIFOLD NEPTUNE 2 SYS 4 PORT 0702-020-000

## (undated) DEVICE — LINEN GOWN XLG 5407

## (undated) DEVICE — PACK ACL SUPPLEMENT CUSTOM ASC

## (undated) DEVICE — PEN MARKING SKIN W/PAPER RULER 31145785

## (undated) DEVICE — TUBING SYSTEM ARTHREX PATIENT REDEUCE AR-6421

## (undated) DEVICE — DRAPE C-ARM OEC MINI VIEW 6800   00-901917-01

## (undated) DEVICE — LINEN ORTHO PACK 5446

## (undated) DEVICE — BUR ARTHREX COOLCUT SABRE 3.8MMX13CM AR-8380SR

## (undated) DEVICE — PREP DURAPREP REMOVER 4OZ 8611

## (undated) DEVICE — ABLATOR ARTHREX APOLLO RF MP90 ASPIRATING 90DEG AR-9811

## (undated) DEVICE — SU ETHILON 3-0 PS-1 18" 1663G

## (undated) DEVICE — PREP DURAPREP 26ML APL 8630

## (undated) DEVICE — LINEN TOWEL PACK X5 5464

## (undated) DEVICE — DRSG STERI STRIP 1/2X4" R1547

## (undated) DEVICE — SU VICRYL 0 CT-1 27" UND J260H

## (undated) DEVICE — TUBING SUCTION MEDI-VAC 1/4"X20' N620A

## (undated) DEVICE — SOL NACL 0.9% IRRIG 3000ML BAG 2B7477

## (undated) DEVICE — DRAPE TIBURON TOP SHEET 100X60" 29352

## (undated) DEVICE — SU ETHIBOND 1 CT-1 30" X425H

## (undated) DEVICE — GLOVE PROTEXIS BLUE W/NEU-THERA 8.0  2D73EB80

## (undated) DEVICE — SU VICRYL 2-0 CT-1 27" UND J259H

## (undated) DEVICE — GLOVE PROTEXIS POWDER FREE SMT 8.0  2D72PT80X

## (undated) DEVICE — BNDG SPANDAGRIP SZ J LF BEIGE 6.75" SAG13117

## (undated) RX ORDER — ONDANSETRON 2 MG/ML
INJECTION INTRAMUSCULAR; INTRAVENOUS
Status: DISPENSED
Start: 2018-04-27

## (undated) RX ORDER — OXYCODONE HYDROCHLORIDE 5 MG/1
TABLET ORAL
Status: DISPENSED
Start: 2018-04-27

## (undated) RX ORDER — GABAPENTIN 300 MG/1
CAPSULE ORAL
Status: DISPENSED
Start: 2018-04-27

## (undated) RX ORDER — ACETAMINOPHEN 325 MG/1
TABLET ORAL
Status: DISPENSED
Start: 2018-04-27

## (undated) RX ORDER — DEXAMETHASONE SODIUM PHOSPHATE 4 MG/ML
INJECTION, SOLUTION INTRA-ARTICULAR; INTRALESIONAL; INTRAMUSCULAR; INTRAVENOUS; SOFT TISSUE
Status: DISPENSED
Start: 2018-04-27

## (undated) RX ORDER — HYDROMORPHONE HYDROCHLORIDE 1 MG/ML
INJECTION, SOLUTION INTRAMUSCULAR; INTRAVENOUS; SUBCUTANEOUS
Status: DISPENSED
Start: 2018-04-27

## (undated) RX ORDER — TRIAMCINOLONE ACETONIDE 40 MG/ML
INJECTION, SUSPENSION INTRA-ARTICULAR; INTRAMUSCULAR
Status: DISPENSED
Start: 2019-07-15

## (undated) RX ORDER — EPHEDRINE SULFATE 50 MG/ML
INJECTION, SOLUTION INTRAMUSCULAR; INTRAVENOUS; SUBCUTANEOUS
Status: DISPENSED
Start: 2018-04-27

## (undated) RX ORDER — LIDOCAINE HYDROCHLORIDE 5 MG/ML
INJECTION, SOLUTION INFILTRATION; INTRAVENOUS
Status: DISPENSED
Start: 2019-07-15

## (undated) RX ORDER — HYDROXYZINE HYDROCHLORIDE 25 MG/1
TABLET, FILM COATED ORAL
Status: DISPENSED
Start: 2018-04-27

## (undated) RX ORDER — FENTANYL CITRATE 50 UG/ML
INJECTION, SOLUTION INTRAMUSCULAR; INTRAVENOUS
Status: DISPENSED
Start: 2018-04-27

## (undated) RX ORDER — LIDOCAINE HYDROCHLORIDE 20 MG/ML
INJECTION, SOLUTION EPIDURAL; INFILTRATION; INTRACAUDAL; PERINEURAL
Status: DISPENSED
Start: 2018-04-27

## (undated) RX ORDER — BUPIVACAINE HYDROCHLORIDE 2.5 MG/ML
INJECTION, SOLUTION EPIDURAL; INFILTRATION; INTRACAUDAL
Status: DISPENSED
Start: 2018-04-27

## (undated) RX ORDER — KETOROLAC TROMETHAMINE 30 MG/ML
INJECTION, SOLUTION INTRAMUSCULAR; INTRAVENOUS
Status: DISPENSED
Start: 2018-04-27

## (undated) RX ORDER — PROPOFOL 10 MG/ML
INJECTION, EMULSION INTRAVENOUS
Status: DISPENSED
Start: 2018-04-27